# Patient Record
Sex: FEMALE | Race: WHITE | NOT HISPANIC OR LATINO | URBAN - METROPOLITAN AREA
[De-identification: names, ages, dates, MRNs, and addresses within clinical notes are randomized per-mention and may not be internally consistent; named-entity substitution may affect disease eponyms.]

---

## 2017-12-05 ENCOUNTER — EMERGENCY (EMERGENCY)
Facility: HOSPITAL | Age: 47
LOS: 0 days | Discharge: HOME | End: 2017-12-06

## 2017-12-05 DIAGNOSIS — M25.531 PAIN IN RIGHT WRIST: ICD-10-CM

## 2017-12-05 DIAGNOSIS — S69.91XA UNSPECIFIED INJURY OF RIGHT WRIST, HAND AND FINGER(S), INITIAL ENCOUNTER: ICD-10-CM

## 2017-12-05 DIAGNOSIS — Z90.11 ACQUIRED ABSENCE OF RIGHT BREAST AND NIPPLE: ICD-10-CM

## 2017-12-05 DIAGNOSIS — M79.89 OTHER SPECIFIED SOFT TISSUE DISORDERS: ICD-10-CM
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
## 2017-12-05 DIAGNOSIS — Z79.899 OTHER LONG TERM (CURRENT) DRUG THERAPY: ICD-10-CM
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
## 2021-08-06 ENCOUNTER — TRANSCRIPTION ENCOUNTER (OUTPATIENT)
Age: 51
End: 2021-08-06

## 2022-01-11 ENCOUNTER — NON-APPOINTMENT (OUTPATIENT)
Age: 52
End: 2022-01-11

## 2022-01-11 PROBLEM — Z00.00 ENCOUNTER FOR PREVENTIVE HEALTH EXAMINATION: Status: ACTIVE | Noted: 2022-01-11

## 2022-01-12 ENCOUNTER — NON-APPOINTMENT (OUTPATIENT)
Age: 52
End: 2022-01-12

## 2022-01-12 ENCOUNTER — APPOINTMENT (OUTPATIENT)
Dept: OBGYN | Facility: CLINIC | Age: 52
End: 2022-01-12
Payer: COMMERCIAL

## 2022-01-12 VITALS
HEART RATE: 100 BPM | HEIGHT: 66 IN | WEIGHT: 202 LBS | TEMPERATURE: 98.2 F | BODY MASS INDEX: 32.47 KG/M2 | SYSTOLIC BLOOD PRESSURE: 126 MMHG | DIASTOLIC BLOOD PRESSURE: 84 MMHG

## 2022-01-12 DIAGNOSIS — Z80.3 FAMILY HISTORY OF MALIGNANT NEOPLASM OF BREAST: ICD-10-CM

## 2022-01-12 DIAGNOSIS — Z86.39 PERSONAL HISTORY OF OTHER ENDOCRINE, NUTRITIONAL AND METABOLIC DISEASE: ICD-10-CM

## 2022-01-12 PROCEDURE — 99203 OFFICE O/P NEW LOW 30 MIN: CPT | Mod: 25

## 2022-01-12 PROCEDURE — 76830 TRANSVAGINAL US NON-OB: CPT

## 2022-01-12 NOTE — PROCEDURE
[Abnormal Uterine Bleeding] : abnormal uterine bleeding [Transvaginal Ultrasound] : transvaginal ultrasound [FreeTextEntry3] : thickened cystic endometrium\par no free fluid\par cervix normal [FreeTextEntry5] : 130cc volume,   16.4mm thick, cystic spaced [FreeTextEntry7] : 2.4cc [FreeTextEntry8] : 3.3cc

## 2022-01-12 NOTE — HISTORY OF PRESENT ILLNESS
[Irregular Menstrual Interval] : irregular menstrual interval [Abnormal Quantity] : abnormal quantity [Heavy Bleeding] : heavy bleeding [PGHxTotal] : 3 [[facility]] : 3 [FreeTextEntry1] : 3 's

## 2022-01-14 ENCOUNTER — NON-APPOINTMENT (OUTPATIENT)
Age: 52
End: 2022-01-14

## 2022-01-19 ENCOUNTER — RESULT REVIEW (OUTPATIENT)
Age: 52
End: 2022-01-19

## 2022-01-19 ENCOUNTER — OUTPATIENT (OUTPATIENT)
Dept: OUTPATIENT SERVICES | Facility: HOSPITAL | Age: 52
LOS: 1 days | Discharge: HOME | End: 2022-01-19
Payer: COMMERCIAL

## 2022-01-19 VITALS
SYSTOLIC BLOOD PRESSURE: 132 MMHG | HEART RATE: 72 BPM | RESPIRATION RATE: 16 BRPM | OXYGEN SATURATION: 100 % | WEIGHT: 200.62 LBS | HEIGHT: 66 IN | TEMPERATURE: 98 F | DIASTOLIC BLOOD PRESSURE: 65 MMHG

## 2022-01-19 DIAGNOSIS — Z01.818 ENCOUNTER FOR OTHER PREPROCEDURAL EXAMINATION: ICD-10-CM

## 2022-01-19 DIAGNOSIS — N95.0 POSTMENOPAUSAL BLEEDING: ICD-10-CM

## 2022-01-19 DIAGNOSIS — Z98.891 HISTORY OF UTERINE SCAR FROM PREVIOUS SURGERY: Chronic | ICD-10-CM

## 2022-01-19 DIAGNOSIS — Z90.11 ACQUIRED ABSENCE OF RIGHT BREAST AND NIPPLE: Chronic | ICD-10-CM

## 2022-01-19 LAB
A1C WITH ESTIMATED AVERAGE GLUCOSE RESULT: 7.5 % — HIGH (ref 4–5.6)
ALBUMIN SERPL ELPH-MCNC: 3.8 G/DL — SIGNIFICANT CHANGE UP (ref 3.5–5.2)
ALP SERPL-CCNC: 57 U/L — SIGNIFICANT CHANGE UP (ref 30–115)
ALT FLD-CCNC: 26 U/L — SIGNIFICANT CHANGE UP (ref 0–41)
ANION GAP SERPL CALC-SCNC: 12 MMOL/L — SIGNIFICANT CHANGE UP (ref 7–14)
APTT BLD: 31 SEC — SIGNIFICANT CHANGE UP (ref 27–39.2)
AST SERPL-CCNC: 21 U/L — SIGNIFICANT CHANGE UP (ref 0–41)
BASOPHILS # BLD AUTO: 0.04 K/UL — SIGNIFICANT CHANGE UP (ref 0–0.2)
BASOPHILS NFR BLD AUTO: 0.8 % — SIGNIFICANT CHANGE UP (ref 0–1)
BILIRUB SERPL-MCNC: 0.3 MG/DL — SIGNIFICANT CHANGE UP (ref 0.2–1.2)
BUN SERPL-MCNC: 17 MG/DL — SIGNIFICANT CHANGE UP (ref 10–20)
CALCIUM SERPL-MCNC: 9.5 MG/DL — SIGNIFICANT CHANGE UP (ref 8.5–10.1)
CHLORIDE SERPL-SCNC: 105 MMOL/L — SIGNIFICANT CHANGE UP (ref 98–110)
CO2 SERPL-SCNC: 24 MMOL/L — SIGNIFICANT CHANGE UP (ref 17–32)
CREAT SERPL-MCNC: 0.6 MG/DL — LOW (ref 0.7–1.5)
EOSINOPHIL # BLD AUTO: 0.06 K/UL — SIGNIFICANT CHANGE UP (ref 0–0.7)
EOSINOPHIL NFR BLD AUTO: 1.2 % — SIGNIFICANT CHANGE UP (ref 0–8)
ESTIMATED AVERAGE GLUCOSE: 169 MG/DL — HIGH (ref 68–114)
GLUCOSE SERPL-MCNC: 142 MG/DL — HIGH (ref 70–99)
HCT VFR BLD CALC: 38.1 % — SIGNIFICANT CHANGE UP (ref 37–47)
HGB BLD-MCNC: 11.7 G/DL — LOW (ref 12–16)
IMM GRANULOCYTES NFR BLD AUTO: 0.2 % — SIGNIFICANT CHANGE UP (ref 0.1–0.3)
INR BLD: 0.96 RATIO — SIGNIFICANT CHANGE UP (ref 0.65–1.3)
LYMPHOCYTES # BLD AUTO: 1.57 K/UL — SIGNIFICANT CHANGE UP (ref 1.2–3.4)
LYMPHOCYTES # BLD AUTO: 31.9 % — SIGNIFICANT CHANGE UP (ref 20.5–51.1)
MCHC RBC-ENTMCNC: 25.7 PG — LOW (ref 27–31)
MCHC RBC-ENTMCNC: 30.7 G/DL — LOW (ref 32–37)
MCV RBC AUTO: 83.6 FL — SIGNIFICANT CHANGE UP (ref 81–99)
MONOCYTES # BLD AUTO: 0.43 K/UL — SIGNIFICANT CHANGE UP (ref 0.1–0.6)
MONOCYTES NFR BLD AUTO: 8.7 % — SIGNIFICANT CHANGE UP (ref 1.7–9.3)
NEUTROPHILS # BLD AUTO: 2.81 K/UL — SIGNIFICANT CHANGE UP (ref 1.4–6.5)
NEUTROPHILS NFR BLD AUTO: 57.2 % — SIGNIFICANT CHANGE UP (ref 42.2–75.2)
NRBC # BLD: 0 /100 WBCS — SIGNIFICANT CHANGE UP (ref 0–0)
PLATELET # BLD AUTO: 280 K/UL — SIGNIFICANT CHANGE UP (ref 130–400)
POTASSIUM SERPL-MCNC: 5 MMOL/L — SIGNIFICANT CHANGE UP (ref 3.5–5)
POTASSIUM SERPL-SCNC: 5 MMOL/L — SIGNIFICANT CHANGE UP (ref 3.5–5)
PROT SERPL-MCNC: 6.7 G/DL — SIGNIFICANT CHANGE UP (ref 6–8)
PROTHROM AB SERPL-ACNC: 11 SEC — SIGNIFICANT CHANGE UP (ref 9.95–12.87)
RBC # BLD: 4.56 M/UL — SIGNIFICANT CHANGE UP (ref 4.2–5.4)
RBC # FLD: 16.8 % — HIGH (ref 11.5–14.5)
SODIUM SERPL-SCNC: 141 MMOL/L — SIGNIFICANT CHANGE UP (ref 135–146)
WBC # BLD: 4.92 K/UL — SIGNIFICANT CHANGE UP (ref 4.8–10.8)
WBC # FLD AUTO: 4.92 K/UL — SIGNIFICANT CHANGE UP (ref 4.8–10.8)

## 2022-01-19 PROCEDURE — 71046 X-RAY EXAM CHEST 2 VIEWS: CPT | Mod: 26

## 2022-01-19 PROCEDURE — 93010 ELECTROCARDIOGRAM REPORT: CPT

## 2022-01-19 NOTE — H&P PST ADULT - HISTORY OF PRESENT ILLNESS
PT PRESENTS TO PAST WITH NO SOB, CP, PALPITATIONS, DYSURIA, UTI OR URI AT PRESENT.   PT ABLE TO WALK UP 2-3 FLIGHTS OF STEPS WITH NO SOB.  AS PER THE PT, THIS IS HIS/HER COMPLETE MEDICAL AND SURGICAL HX, INCLUDING MEDICATIONS PRESCRIBED AND OVER THE COUNTER  pt denies any covid s/s, YES  12/21   tested positive in the past-- PT IS AWARE OF DATE AND TIME OF COVID TESTING PRIOR TO PROCEDURE.   pt advised self quarantine till day of procedure  denies travel outside the [place] in the past 30 days  Anesthesia Alert  NO--Difficult Airway  NO--History of neck surgery YES  radiation-2014   NO--Limited ROM of neck  NO--History of Malignant hyperthermia  NO--Personal or family history of Pseudocholinesterase deficiency  NO--Prior Anesthesia Complication  NO--Latex Allergy  NO--Loose teeth  NO--History of Rheumatoid Arthritis  NO--SRINATH  NO BLEEDING RISK  NO--Other_____

## 2022-01-19 NOTE — H&P PST ADULT - REASON FOR ADMISSION
Patient is a  51  year old female presenting to PAST in preparation for   dilation and curettage hysteroscopy symphon procedure      on   1/28/22    under gen   anesthesia by Dr. Madrigal .  Pt reports-I HAVE POST MENOPAUSAL BLEEDING FOR A FEW YEARS ON AND OFF. I HAVE NO PAIN ASSOCIATED WITH THE BLEEDING.

## 2022-01-19 NOTE — H&P PST ADULT - NSANTHOSAYNRD_GEN_A_CORE
No. SRINATH screening performed.  STOP BANG Legend: 0-2 = LOW Risk; 3-4 = INTERMEDIATE Risk; 5-8 = HIGH Risk

## 2022-01-21 ENCOUNTER — APPOINTMENT (OUTPATIENT)
Dept: GYNECOLOGIC ONCOLOGY | Facility: CLINIC | Age: 52
End: 2022-01-21

## 2022-01-25 ENCOUNTER — LABORATORY RESULT (OUTPATIENT)
Age: 52
End: 2022-01-25

## 2022-01-28 ENCOUNTER — RESULT REVIEW (OUTPATIENT)
Age: 52
End: 2022-01-28

## 2022-01-28 ENCOUNTER — APPOINTMENT (OUTPATIENT)
Dept: OBGYN | Facility: HOSPITAL | Age: 52
End: 2022-01-28

## 2022-01-28 ENCOUNTER — OUTPATIENT (OUTPATIENT)
Dept: OUTPATIENT SERVICES | Facility: HOSPITAL | Age: 52
LOS: 1 days | Discharge: HOME | End: 2022-01-28
Payer: COMMERCIAL

## 2022-01-28 VITALS
SYSTOLIC BLOOD PRESSURE: 135 MMHG | RESPIRATION RATE: 20 BRPM | OXYGEN SATURATION: 100 % | DIASTOLIC BLOOD PRESSURE: 61 MMHG | HEART RATE: 70 BPM

## 2022-01-28 VITALS
WEIGHT: 199.96 LBS | HEIGHT: 66 IN | SYSTOLIC BLOOD PRESSURE: 121 MMHG | TEMPERATURE: 97 F | RESPIRATION RATE: 17 BRPM | OXYGEN SATURATION: 100 % | DIASTOLIC BLOOD PRESSURE: 71 MMHG | HEART RATE: 77 BPM

## 2022-01-28 DIAGNOSIS — Z90.11 ACQUIRED ABSENCE OF RIGHT BREAST AND NIPPLE: Chronic | ICD-10-CM

## 2022-01-28 DIAGNOSIS — Z98.891 HISTORY OF UTERINE SCAR FROM PREVIOUS SURGERY: Chronic | ICD-10-CM

## 2022-01-28 LAB — GLUCOSE BLDC GLUCOMTR-MCNC: 149 MG/DL — HIGH (ref 70–99)

## 2022-01-28 PROCEDURE — 88305 TISSUE EXAM BY PATHOLOGIST: CPT | Mod: 26

## 2022-01-28 PROCEDURE — 58561 HYSTEROSCOPY REMOVE MYOMA: CPT

## 2022-01-28 NOTE — ASU DISCHARGE PLAN (ADULT/PEDIATRIC) - CARE PROVIDER_API CALL
Medication is being filled for 1 time refill only due to:  Patient needs labs lipids.    
Reason for Call:  Medication or medication refill:    Do you use a [facility]?  Name of the pharmacy and phone number for the current request:  [id number], [place] [address] [facility]    Name of the medication requested: Niacin CR 1000 MG TBCR    Other request: if any questions or concern pt gave permission to contact him on cell phone or his direct number at work. [phone]    Can we leave a detailed message on this number? YES    Phone number patient can be reached at: Cell number on file:    Telephone Information:   Mobile [phone]       Best Time: anytime.    Call taken on 1/31/2017 at 11:35 AM by Joellen López      
niacin cr 1000 MG TBCR       Last Written Prescription Date: 1/23/2017  Last Fill Quantity: 30, # refills: 0    Last Office Visit with [facility], [facility] or [facility] prescribing provider:  1/25/2017   Future Office Visit:      CHOLESTEROL   Date Value Ref Range Status   01/25/2016 145 <200 mg/dL Final     HDL CHOLESTEROL   Date Value Ref Range Status   01/25/2016 64 >39 mg/dL Final     LDL CHOLESTEROL CALCULATED   Date Value Ref Range Status   01/25/2016 63 <100 mg/dL Final     Comment:     Desirable:       <100 mg/dl     TRIGLYCERIDES   Date Value Ref Range Status   01/25/2016 91 <150 mg/dL Final     CHOLESTEROL/HDL RATIO   Date Value Ref Range Status   05/07/2014 2.4 0.0 - 5.0 Final     ALT   Date Value Ref Range Status   01/25/2016 32 0 - 70 U/L Final            
Elías Madrigal)  Obstetrics and Gynecology  [facility] [place] [address]  Phone: [phone]  Fax: [phone]  Follow Up Time: 2 weeks

## 2022-01-28 NOTE — ASU DISCHARGE PLAN (ADULT/PEDIATRIC) - NS MD DC FALL RISK RISK
For information on Fall & Injury Prevention, visit: [email or web address] OR  [email or web address] OR  https://www.cdc.gov/steadi/patient.html

## 2022-01-28 NOTE — ASU PATIENT PROFILE, ADULT - FALL HARM RISK - UNIVERSAL INTERVENTIONS
Bed in lowest position, wheels locked, appropriate side rails in place/Call bell, personal items and telephone in reach/Instruct patient to call for assistance before getting out of bed or chair/Non-slip footwear when patient is out of bed/[place] to call system/Physically safe environment - no spills, clutter or unnecessary equipment/Purposeful Proactive Rounding/Room/bathroom lighting operational, light cord in reach

## 2022-01-28 NOTE — PRE-ANESTHESIA EVALUATION ADULT - BP NONINVASIVE SYSTOLIC (MM HG)
Returned patient call after talking to DR Prince. Reviewed DR Prince reiterated that if patient positive COVID then she will be cancelled for ER and it is a guerra considering cost. Patient emotional about cancelling. Discussed patient going off OCPs for one month so she is not on OCPs for 3 months straight prior to stimulation. Patient will continue to monitor symptoms and will be in communication with clinic if she wants to continue this cycle or not.    121

## 2022-01-28 NOTE — CHART NOTE - NSCHARTNOTEFT_GEN_A_CORE
PACU ANESTHESIA ADMISSION NOTE      Procedure: Hysteroscopy, with dilation and curettage of uterus, with polypectomy or myomectomy      Post op diagnosis:  Uterine polyp        ____  Intubated  TV:______       Rate: ______      FiO2: ______    _x___  Patent Airway    _x___  Full return of protective reflexes    _x___  Full recovery from anesthesia / back to baseline status    Vitals:  T(F): 96.9   HR: 78  BP: 116/71  RR: 12  SpO2: 99%     Mental Status:  _x___ Awake   _x____ Alert   _____ Drowsy   _____ Sedated    Nausea/Vomiting:  _x___  NO       ______Yes,   See Post - Op Orders         Pain Scale (0-10):  __0___    Treatment: _x___ None    ____ See Post - Op/PCA Orders    Post - Operative Fluids:   __x__ Oral   ____ See Post - Op Orders    Plan: Discharge:   _x___Home       _____Floor     _____Critical Care    _____  Other:_________________    Comments:  No anesthesia issues or complications noted.  Discharge when criteria met.

## 2022-01-28 NOTE — BRIEF OPERATIVE NOTE - NSICDXBRIEFPROCEDURE_GEN_ALL_CORE_FT
PROCEDURES:  Hysteroscopy, with dilation and curettage of uterus, with polypectomy or myomectomy 28-Jan-2022 10:35:16  Karina Collier

## 2022-01-28 NOTE — BRIEF OPERATIVE NOTE - OPERATION/FINDINGS
Examined under anesthesia which revealed a 9 week sized anteverted uterus. Mobile. Normal vagina and cervix. Hysteroscopy revealed multiple polyps in endometrial cavity, resected with symphion device. Gentle sharp curettage revealed moderate amounts of endometrial tissue.

## 2022-02-02 ENCOUNTER — NON-APPOINTMENT (OUTPATIENT)
Age: 52
End: 2022-02-02

## 2022-02-02 ENCOUNTER — EMERGENCY (EMERGENCY)
Facility: HOSPITAL | Age: 52
LOS: 0 days | Discharge: HOME | End: 2022-02-02
Attending: EMERGENCY MEDICINE | Admitting: EMERGENCY MEDICINE
Payer: COMMERCIAL

## 2022-02-02 VITALS
OXYGEN SATURATION: 100 % | HEIGHT: 66 IN | DIASTOLIC BLOOD PRESSURE: 80 MMHG | TEMPERATURE: 97 F | RESPIRATION RATE: 18 BRPM | WEIGHT: 199.96 LBS | SYSTOLIC BLOOD PRESSURE: 142 MMHG | HEART RATE: 79 BPM

## 2022-02-02 VITALS
DIASTOLIC BLOOD PRESSURE: 66 MMHG | TEMPERATURE: 98 F | HEART RATE: 74 BPM | OXYGEN SATURATION: 99 % | SYSTOLIC BLOOD PRESSURE: 122 MMHG | RESPIRATION RATE: 16 BRPM

## 2022-02-02 DIAGNOSIS — E11.9 TYPE 2 DIABETES MELLITUS WITHOUT COMPLICATIONS: ICD-10-CM

## 2022-02-02 DIAGNOSIS — C50.919 MALIGNANT NEOPLASM OF UNSPECIFIED SITE OF UNSPECIFIED FEMALE BREAST: ICD-10-CM

## 2022-02-02 DIAGNOSIS — R51.9 HEADACHE, UNSPECIFIED: ICD-10-CM

## 2022-02-02 DIAGNOSIS — Z98.891 HISTORY OF UTERINE SCAR FROM PREVIOUS SURGERY: Chronic | ICD-10-CM

## 2022-02-02 DIAGNOSIS — Z90.11 ACQUIRED ABSENCE OF RIGHT BREAST AND NIPPLE: Chronic | ICD-10-CM

## 2022-02-02 DIAGNOSIS — R11.2 NAUSEA WITH VOMITING, UNSPECIFIED: ICD-10-CM

## 2022-02-02 DIAGNOSIS — Z79.84 LONG TERM (CURRENT) USE OF ORAL HYPOGLYCEMIC DRUGS: ICD-10-CM

## 2022-02-02 LAB
ALBUMIN SERPL ELPH-MCNC: 3 G/DL — LOW (ref 3.5–5.2)
ALP SERPL-CCNC: 37 U/L — SIGNIFICANT CHANGE UP (ref 30–115)
ALT FLD-CCNC: 14 U/L — SIGNIFICANT CHANGE UP (ref 0–41)
ANION GAP SERPL CALC-SCNC: 13 MMOL/L — SIGNIFICANT CHANGE UP (ref 7–14)
AST SERPL-CCNC: 15 U/L — SIGNIFICANT CHANGE UP (ref 0–41)
BASOPHILS # BLD AUTO: 0.02 K/UL — SIGNIFICANT CHANGE UP (ref 0–0.2)
BASOPHILS NFR BLD AUTO: 0.5 % — SIGNIFICANT CHANGE UP (ref 0–1)
BILIRUB SERPL-MCNC: <0.2 MG/DL — SIGNIFICANT CHANGE UP (ref 0.2–1.2)
BUN SERPL-MCNC: 10 MG/DL — SIGNIFICANT CHANGE UP (ref 10–20)
CALCIUM SERPL-MCNC: 8.4 MG/DL — LOW (ref 8.5–10.1)
CHLORIDE SERPL-SCNC: 107 MMOL/L — SIGNIFICANT CHANGE UP (ref 98–110)
CO2 SERPL-SCNC: 19 MMOL/L — SIGNIFICANT CHANGE UP (ref 17–32)
CREAT SERPL-MCNC: <0.5 MG/DL — LOW (ref 0.7–1.5)
EOSINOPHIL # BLD AUTO: 0.01 K/UL — SIGNIFICANT CHANGE UP (ref 0–0.7)
EOSINOPHIL NFR BLD AUTO: 0.3 % — SIGNIFICANT CHANGE UP (ref 0–8)
GLUCOSE SERPL-MCNC: 98 MG/DL — SIGNIFICANT CHANGE UP (ref 70–99)
HCG SERPL QL: NEGATIVE — SIGNIFICANT CHANGE UP
HCT VFR BLD CALC: 33.6 % — LOW (ref 37–47)
HGB BLD-MCNC: 10.6 G/DL — LOW (ref 12–16)
IMM GRANULOCYTES NFR BLD AUTO: 0.3 % — SIGNIFICANT CHANGE UP (ref 0.1–0.3)
LYMPHOCYTES # BLD AUTO: 1.5 K/UL — SIGNIFICANT CHANGE UP (ref 1.2–3.4)
LYMPHOCYTES # BLD AUTO: 41 % — SIGNIFICANT CHANGE UP (ref 20.5–51.1)
MCHC RBC-ENTMCNC: 26.4 PG — LOW (ref 27–31)
MCHC RBC-ENTMCNC: 31.5 G/DL — LOW (ref 32–37)
MCV RBC AUTO: 83.6 FL — SIGNIFICANT CHANGE UP (ref 81–99)
MONOCYTES # BLD AUTO: 0.25 K/UL — SIGNIFICANT CHANGE UP (ref 0.1–0.6)
MONOCYTES NFR BLD AUTO: 6.8 % — SIGNIFICANT CHANGE UP (ref 1.7–9.3)
NEUTROPHILS # BLD AUTO: 1.87 K/UL — SIGNIFICANT CHANGE UP (ref 1.4–6.5)
NEUTROPHILS NFR BLD AUTO: 51.1 % — SIGNIFICANT CHANGE UP (ref 42.2–75.2)
NRBC # BLD: 0 /100 WBCS — SIGNIFICANT CHANGE UP (ref 0–0)
PLATELET # BLD AUTO: 246 K/UL — SIGNIFICANT CHANGE UP (ref 130–400)
POTASSIUM SERPL-MCNC: 4 MMOL/L — SIGNIFICANT CHANGE UP (ref 3.5–5)
POTASSIUM SERPL-SCNC: 4 MMOL/L — SIGNIFICANT CHANGE UP (ref 3.5–5)
PROT SERPL-MCNC: 4.9 G/DL — LOW (ref 6–8)
RBC # BLD: 4.02 M/UL — LOW (ref 4.2–5.4)
RBC # FLD: 16.2 % — HIGH (ref 11.5–14.5)
SODIUM SERPL-SCNC: 139 MMOL/L — SIGNIFICANT CHANGE UP (ref 135–146)
WBC # BLD: 3.66 K/UL — LOW (ref 4.8–10.8)
WBC # FLD AUTO: 3.66 K/UL — LOW (ref 4.8–10.8)

## 2022-02-02 PROCEDURE — 99285 EMERGENCY DEPT VISIT HI MDM: CPT

## 2022-02-02 PROCEDURE — 70450 CT HEAD/BRAIN W/O DYE: CPT | Mod: 26,MA

## 2022-02-02 RX ORDER — METOCLOPRAMIDE HCL 10 MG
10 TABLET ORAL ONCE
Refills: 0 | Status: COMPLETED | OUTPATIENT
Start: 2022-02-02 | End: 2022-02-02

## 2022-02-02 RX ORDER — SODIUM CHLORIDE 9 MG/ML
1000 INJECTION, SOLUTION INTRAVENOUS ONCE
Refills: 0 | Status: COMPLETED | OUTPATIENT
Start: 2022-02-02 | End: 2022-02-02

## 2022-02-02 RX ORDER — METOCLOPRAMIDE HCL 10 MG
1 TABLET ORAL
Qty: 9 | Refills: 0
Start: 2022-02-02 | End: 2022-02-04

## 2022-02-02 RX ORDER — ACETAMINOPHEN 500 MG
650 TABLET ORAL ONCE
Refills: 0 | Status: COMPLETED | OUTPATIENT
Start: 2022-02-02 | End: 2022-02-02

## 2022-02-02 RX ADMIN — Medication 104 MILLIGRAM(S): at 17:30

## 2022-02-02 RX ADMIN — SODIUM CHLORIDE 1000 MILLILITER(S): 9 INJECTION, SOLUTION INTRAVENOUS at 17:30

## 2022-02-02 RX ADMIN — Medication 650 MILLIGRAM(S): at 17:30

## 2022-02-02 NOTE — ED PROVIDER NOTE - PROGRESS NOTE DETAILS
pt feeling better, results discussed at the bedside with daughter present, return precautions given and understood

## 2022-02-02 NOTE — ED PROVIDER NOTE - NSFOLLOWUPCLINICS_GEN_ALL_ED_FT
[facility]  Neurology  [address] [place] [address], [facility] [address]  [place] [address]  Phone: [phone]  Fax:   Follow Up Time: 1-3 Days

## 2022-02-02 NOTE — ED PROVIDER NOTE - COVID-19 ORDERING FACILITY
[facility]  - [facility] Obstetrics Gynecology at [facility]

## 2022-02-02 NOTE — ED PROVIDER NOTE - PATIENT PORTAL LINK FT
You can access the FollowMyHealth Patient Portal offered by [facility] by registering at the following website: [email or web address]. By joining [employer]’s FollowMyHealth portal, you will also be able to view your health information using other applications (apps) compatible with our system.

## 2022-02-02 NOTE — ED ADULT NURSE NOTE - CAS TRG GEN SKIN CONDITION
Dapsone Pregnancy And Lactation Text: This medication is Pregnancy Category C and is not considered safe during pregnancy or breast feeding. Bactrim Pregnancy And Lactation Text: This medication is Pregnancy Category D and is known to cause fetal risk.  It is also excreted in breast milk. Minocycline Pregnancy And Lactation Text: This medication is Pregnancy Category D and not consider safe during pregnancy. It is also excreted in breast milk. Sarecycline Counseling: Patient advised regarding possible photosensitivity and discoloration of the teeth, skin, lips, tongue and gums.  Patient instructed to avoid sunlight, if possible.  When exposed to sunlight, patients should wear protective clothing, sunglasses, and sunscreen.  The patient was instructed to call the office immediately if the following severe adverse effects occur:  hearing changes, easy bruising/bleeding, severe headache, or vision changes.  The patient verbalized understanding of the proper use and possible adverse effects of sarecycline.  All of the patient's questions and concerns were addressed. Bactrim Counseling:  I discussed with the patient the risks of sulfa antibiotics including but not limited to GI upset, allergic reaction, drug rash, diarrhea, dizziness, photosensitivity, and yeast infections.  Rarely, more serious reactions can occur including but not limited to aplastic anemia, agranulocytosis, methemoglobinemia, blood dyscrasias, liver or kidney failure, lung infiltrates or desquamative/blistering drug rashes. Topical Clindamycin Pregnancy And Lactation Text: This medication is Pregnancy Category B and is considered safe during pregnancy. It is unknown if it is excreted in breast milk. Topical Retinoid counseling:  Patient advised to apply a pea-sized amount only at bedtime and wait 30 minutes after washing their face before applying.  If too drying, patient may add a non-comedogenic moisturizer. The patient verbalized understanding of the proper use and possible adverse effects of retinoids.  All of the patient's questions and concerns were addressed. Erythromycin Counseling:  I discussed with the patient the risks of erythromycin including but not limited to GI upset, allergic reaction, drug rash, diarrhea, increase in liver enzymes, and yeast infections. Detail Level: Zone Topical Retinoid Pregnancy And Lactation Text: This medication is Pregnancy Category C. It is unknown if this medication is excreted in breast milk. Topical Sulfur Applications Counseling: Topical Sulfur Counseling: Patient counseled that this medication may cause skin irritation or allergic reactions.  In the event of skin irritation, the patient was advised to reduce the amount of the drug applied or use it less frequently.   The patient verbalized understanding of the proper use and possible adverse effects of topical sulfur application.  All of the patient's questions and concerns were addressed. Dapsone Counseling: I discussed with the patient the risks of dapsone including but not limited to hemolytic anemia, agranulocytosis, rashes, methemoglobinemia, kidney failure, peripheral neuropathy, headaches, GI upset, and liver toxicity.  Patients who start dapsone require monitoring including baseline LFTs and weekly CBCs for the first month, then every month thereafter.  The patient verbalized understanding of the proper use and possible adverse effects of dapsone.  All of the patient's questions and concerns were addressed. Azithromycin Counseling:  I discussed with the patient the risks of azithromycin including but not limited to GI upset, allergic reaction, drug rash, diarrhea, and yeast infections. Detail Level: Simple Topical Clindamycin Counseling: Patient counseled that this medication may cause skin irritation or allergic reactions.  In the event of skin irritation, the patient was advised to reduce the amount of the drug applied or use it less frequently.   The patient verbalized understanding of the proper use and possible adverse effects of clindamycin.  All of the patient's questions and concerns were addressed. Tazorac Pregnancy And Lactation Text: This medication is not safe during pregnancy. It is unknown if this medication is excreted in breast milk. Benzoyl Peroxide Pregnancy And Lactation Text: This medication is Pregnancy Category C. It is unknown if benzoyl peroxide is excreted in breast milk. Include Pregnancy/Lactation Warning?: No Isotretinoin Counseling: Patient should get monthly blood tests, not donate blood, not drive at night if vision affected, not share medication, and not undergo elective surgery for 6 months after tx completed. Side effects reviewed, pt to contact office should one occur. Birth Control Pills Pregnancy And Lactation Text: This medication should be avoided if pregnant and for the first 30 days post-partum. Doxycycline Pregnancy And Lactation Text: This medication is Pregnancy Category D and not consider safe during pregnancy. It is also excreted in breast milk but is considered safe for shorter treatment courses. Spironolactone Counseling: Patient advised regarding risks of diarrhea, abdominal pain, hyperkalemia, birth defects (for female patients), liver toxicity and renal toxicity. The patient may need blood work to monitor liver and kidney function and potassium levels while on therapy. The patient verbalized understanding of the proper use and possible adverse effects of spironolactone.  All of the patient's questions and concerns were addressed. Benzoyl Peroxide Counseling: Patient counseled that medicine may cause skin irritation and bleach clothing.  In the event of skin irritation, the patient was advised to reduce the amount of the drug applied or use it less frequently.   The patient verbalized understanding of the proper use and possible adverse effects of benzoyl peroxide.  All of the patient's questions and concerns were addressed. Isotretinoin Pregnancy And Lactation Text: This medication is Pregnancy Category X and is considered extremely dangerous during pregnancy. It is unknown if it is excreted in breast milk. High Dose Vitamin A Counseling: Side effects reviewed, pt to contact office should one occur. Doxycycline Counseling:  Patient counseled regarding possible photosensitivity and increased risk for sunburn.  Patient instructed to avoid sunlight, if possible.  When exposed to sunlight, patients should wear protective clothing, sunglasses, and sunscreen.  The patient was instructed to call the office immediately if the following severe adverse effects occur:  hearing changes, easy bruising/bleeding, severe headache, or vision changes.  The patient verbalized understanding of the proper use and possible adverse effects of doxycycline.  All of the patient's questions and concerns were addressed. Birth Control Pills Counseling: Birth Control Pill Counseling: I discussed with the patient the potential side effects of OCPs including but not limited to increased risk of stroke, heart attack, thrombophlebitis, deep venous thrombosis, hepatic adenomas, breast changes, GI upset, headaches, and depression.  The patient verbalized understanding of the proper use and possible adverse effects of OCPs. All of the patient's questions and concerns were addressed. Tetracycline Counseling: Patient counseled regarding possible photosensitivity and increased risk for sunburn.  Patient instructed to avoid sunlight, if possible.  When exposed to sunlight, patients should wear protective clothing, sunglasses, and sunscreen.  The patient was instructed to call the office immediately if the following severe adverse effects occur:  hearing changes, easy bruising/bleeding, severe headache, or vision changes.  The patient verbalized understanding of the proper use and possible adverse effects of tetracycline.  All of the patient's questions and concerns were addressed. Patient understands to avoid pregnancy while on therapy due to potential birth defects. Spironolactone Pregnancy And Lactation Text: This medication can cause feminization of the male fetus and should be avoided during pregnancy. The active metabolite is also found in breast milk. Erythromycin Pregnancy And Lactation Text: This medication is Pregnancy Category B and is considered safe during pregnancy. It is also excreted in breast milk. Azithromycin Pregnancy And Lactation Text: This medication is considered safe during pregnancy and is also secreted in breast milk. High Dose Vitamin A Pregnancy And Lactation Text: High dose vitamin A therapy is contraindicated during pregnancy and breast feeding. Minocycline Counseling: Patient advised regarding possible photosensitivity and discoloration of the teeth, skin, lips, tongue and gums.  Patient instructed to avoid sunlight, if possible.  When exposed to sunlight, patients should wear protective clothing, sunglasses, and sunscreen.  The patient was instructed to call the office immediately if the following severe adverse effects occur:  hearing changes, easy bruising/bleeding, severe headache, or vision changes.  The patient verbalized understanding of the proper use and possible adverse effects of minocycline.  All of the patient's questions and concerns were addressed. Topical Sulfur Applications Pregnancy And Lactation Text: This medication is Pregnancy Category C and has an unknown safety profile during pregnancy. It is unknown if this topical medication is excreted in breast milk. Tazorac Counseling:  Patient advised that medication is irritating and drying.  Patient may need to apply sparingly and wash off after an hour before eventually leaving it on overnight.  The patient verbalized understanding of the proper use and possible adverse effects of tazorac.  All of the patient's questions and concerns were addressed. Warm/Dry

## 2022-02-02 NOTE — ED ADULT TRIAGE NOTE - CHIEF COMPLAINT QUOTE
"since 7am this morning shantel had a headache on both sides of my head with dizziness and I vomited once "

## 2022-02-02 NOTE — ED ADULT NURSE NOTE - OBJECTIVE STATEMENT
presents to ED with headache, nausea & vomiting since this morning. Denies dizziness, weakness, vision changes.

## 2022-02-02 NOTE — ED PROVIDER NOTE - OBJECTIVE STATEMENT
51 y.o. female with a PMH of breast ca. and DM presented to the ER c/o frontal headache this morning associated with nausea and vomiting.  Pt states that her BP was elevated at work.  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  PT compliant with meds.  Denies fever, chills, chest pain, abdominal pain, vision changes, ataxia, dysarthria, extremity weakness/paresthesias.  No other complaints.

## 2022-02-02 NOTE — ED PROVIDER NOTE - NSFOLLOWUPINSTRUCTIONS_ED_ALL_ED_FT
[employer]® CareNotes®     :  [facility]             GENERAL HEADACHE - Ambulatory Care     General Headache    AMBULATORY CARE:    Headache pain may be mild or severe. Common causes include stress, medicines, and head injuries. Sleep problems, allergies, and hormone changes can also cause a headache. You may have frequent headaches that have no clear cause. Pain may start in another part of your body and move to your head. Headache pain can also move to other parts of your body. A headache can cause other symptoms, such as nausea and vomiting. A severe headache may be a sign of a stroke or other serious problem that needs immediate treatment.    Call 911 for any of the following:     You have any of the following signs of a stroke:   Numbness or drooping on one side of your face       Weakness in an arm or leg      Confusion or difficulty speaking      Dizziness, a severe headache, or vision loss        Seek care immediately if:     You have a headache with neck stiffness and a fever.      You have a constant headache and are vomiting.      You have severe pain that does not get better after you take pain medicine.      You have a headache and the pain worsens when you look into light.      You have a headache and vision changes, such as blurred vision.      You have a headache and are forgetful or confused.    Contact your healthcare provider if:     You have a headache each day that does not get better, even after treatment.      You have changes in your headaches, or new symptoms that occur when you have a headache.      Others you live or work with also have headaches.      You have questions or concerns about your condition or care.    Treatment may include any of the following:     Medicines may be given to prevent or treat headache pain. Do not wait until the pain is severe to take your medicine. Ask your healthcare provider how to take the medicine safely.       NSAIDs, such as ibuprofen, help decrease swelling, pain, and fever. This medicine is available with or without a doctor's order. NSAIDs can cause stomach bleeding or kidney problems in certain people. If you take blood thinner medicine, always ask if NSAIDs are safe for you. Always read the medicine label and follow directions. Do not give these medicines to children under 6 months of age without direction from your child's healthcare provider.      Acetaminophen decreases pain and fever. It is available without a doctor's order. Ask how much to take and how often to take it. Follow directions. Read the labels of all other medicines you are using to see if they also contain acetaminophen, or ask your doctor or pharmacist. Acetaminophen can cause liver damage if not taken correctly. Do not use more than 4 grams (4,000 milligrams) total of acetaminophen in one day.       Antinausea medicine may be given to calm your stomach and help prevent vomiting.    Manage your symptoms:     Rest in a dark and quiet room. This may help decrease your pain.      Apply heat or ice as directed. Heat or ice may help decrease pain or muscle spasms. Apply heat or ice on the area for 20 minutes every 2 hours for as many days as directed. Your healthcare provider may recommend that you alternate heat and ice.      Relax your muscles to help relieve a headache. Lie down in a comfortable position and close your eyes. Relax your muscles slowly. Start at your toes and work your way up your body. A massage or warm bath may also help relax your muscles.    Keep a headache record: Record the dates and times that you get headaches, and what you were doing before the headache started. Also record what you ate and drank in the 24 hours before the headache started. This might help your healthcare provider find the cause of your headaches and make a treatment plan. The record can also help you avoid headache triggers or manage your symptoms.    Get enough sleep: You should get 8 to 10 hours of sleep each night. Create a sleep schedule. Go to bed and wake up at the same times each day. It may be helpful to do something relaxing before bed. Do not watch television right before bed.    Do not smoke: Nicotine and other chemicals in cigarettes and cigars can trigger a headache or make it worse. Ask your healthcare provider for information if you currently smoke and need help to quit. E-cigarettes or smokeless tobacco still contain nicotine. Talk to your healthcare provider before you use these products.     Drink liquids as directed: You may need to drink more liquid to prevent dehydration. Dehydration can cause a headache. Ask your healthcare provider how much liquid to drink each day and which liquids are best for you.     Limit caffeine and alcohol as directed: Your headaches may be triggered by caffeine or alcohol. You may also develop a headache if you drink caffeine regularly and suddenly stop.    Eat a variety of healthy foods: Do not skip meals. Too little food can trigger a headache. Include fruits, vegetables, whole-grain breads, low-fat dairy products, beans, lean meat, and fish. Do not have trigger foods, such as chocolate and red wine. Foods that contain gluten, nitrates, MSG, or artificial sweeteners may also trigger a headache.    Follow up with your healthcare provider as directed: Write down your questions so you remember to ask them during your visits.        © Copyright [employer] 2020       back to top                      © Copyright [employer] 2020

## 2022-02-02 NOTE — ED PROVIDER NOTE - NSCAREINITIATED _GEN_ER
"Chief Complaint   Patient presents with     Prenatal Care     OBV 12w3d       Initial /78 (BP Location: Left arm, Patient Position: Chair, Cuff Size: Adult Regular)  Pulse 67  Temp 96.7  F (35.9  C) (Oral)  Wt 137 lb (62.1 kg)  LMP 02/13/2017 (Exact Date)  Breastfeeding? No  BMI 22.8 kg/m2 Estimated body mass index is 22.8 kg/(m^2) as calculated from the following:    Height as of 3/21/17: 5' 5\" (1.651 m).    Weight as of this encounter: 137 lb (62.1 kg).  Medication Reconciliation: complete   Christai ACE Beasley      " Lourdes Garza)

## 2022-02-02 NOTE — ED PROVIDER NOTE - ATTENDING CONTRIBUTION TO CARE
50 yo f with pmh of dm and breast ca under remission, presents with c/o headache with n/v.  pt says started this morning. +frontal and top of head.  no dizziness.  no focal numbness, weakness, facial droop, slurred speech, or vision changes.  no fever, no chills.  no neck pain.  no cp, no sob, no abd pain.  had recent d&c for DUB last week.  exam: nad, ncat, perrl, eomi, mmm, rrr, ctab, abd soft, nt, nd, aox3, cn2-12 normal, 5/5 strength all ext, sensation intact, finger to nose normal, gait normal imp: pt with headache with n/v.  reassuring neuro exam, will check labs and CT head

## 2022-02-04 LAB — SURGICAL PATHOLOGY STUDY: SIGNIFICANT CHANGE UP

## 2022-02-08 DIAGNOSIS — N85.4 MALPOSITION OF UTERUS: ICD-10-CM

## 2022-02-08 DIAGNOSIS — Z92.3 PERSONAL HISTORY OF IRRADIATION: ICD-10-CM

## 2022-02-08 DIAGNOSIS — Z79.84 LONG TERM (CURRENT) USE OF ORAL HYPOGLYCEMIC DRUGS: ICD-10-CM

## 2022-02-08 DIAGNOSIS — Z90.11 ACQUIRED ABSENCE OF RIGHT BREAST AND NIPPLE: ICD-10-CM

## 2022-02-08 DIAGNOSIS — N84.0 POLYP OF CORPUS UTERI: ICD-10-CM

## 2022-02-08 DIAGNOSIS — Z92.21 PERSONAL HISTORY OF ANTINEOPLASTIC CHEMOTHERAPY: ICD-10-CM

## 2022-02-08 DIAGNOSIS — N95.0 POSTMENOPAUSAL BLEEDING: ICD-10-CM

## 2022-02-08 DIAGNOSIS — E11.9 TYPE 2 DIABETES MELLITUS WITHOUT COMPLICATIONS: ICD-10-CM

## 2022-02-08 DIAGNOSIS — Z85.3 PERSONAL HISTORY OF MALIGNANT NEOPLASM OF BREAST: ICD-10-CM

## 2022-02-09 ENCOUNTER — APPOINTMENT (OUTPATIENT)
Dept: OBGYN | Facility: CLINIC | Age: 52
End: 2022-02-09
Payer: COMMERCIAL

## 2022-02-09 VITALS
SYSTOLIC BLOOD PRESSURE: 138 MMHG | HEART RATE: 98 BPM | WEIGHT: 200 LBS | HEIGHT: 66 IN | BODY MASS INDEX: 32.14 KG/M2 | DIASTOLIC BLOOD PRESSURE: 84 MMHG

## 2022-02-09 PROCEDURE — 99213 OFFICE O/P EST LOW 20 MIN: CPT

## 2022-02-09 NOTE — HISTORY OF PRESENT ILLNESS
[FreeTextEntry1] : on 7th year of tamoxifen...had bleeding\par D and C: hyperplasia without atypia.\par Long discussion with slime and her doctor, Dr. Elías Crowe\par

## 2022-02-10 ENCOUNTER — NON-APPOINTMENT (OUTPATIENT)
Age: 52
End: 2022-02-10

## 2022-02-22 ENCOUNTER — LABORATORY RESULT (OUTPATIENT)
Age: 52
End: 2022-02-22

## 2022-02-28 ENCOUNTER — NON-APPOINTMENT (OUTPATIENT)
Age: 52
End: 2022-02-28

## 2022-08-10 ENCOUNTER — APPOINTMENT (OUTPATIENT)
Dept: OBGYN | Facility: CLINIC | Age: 52
End: 2022-08-10

## 2022-08-10 VITALS
BODY MASS INDEX: 32.47 KG/M2 | HEART RATE: 81 BPM | TEMPERATURE: 98.1 F | DIASTOLIC BLOOD PRESSURE: 83 MMHG | SYSTOLIC BLOOD PRESSURE: 127 MMHG | HEIGHT: 66 IN | WEIGHT: 202 LBS

## 2022-08-10 DIAGNOSIS — N76.2 ACUTE VULVITIS: ICD-10-CM

## 2022-08-10 PROCEDURE — 99396 PREV VISIT EST AGE 40-64: CPT | Mod: 25

## 2022-08-10 PROCEDURE — 76830 TRANSVAGINAL US NON-OB: CPT

## 2022-08-10 RX ORDER — CLOTRIMAZOLE AND BETAMETHASONE DIPROPIONATE 10; .5 MG/G; MG/G
1-0.05 CREAM TOPICAL TWICE DAILY
Qty: 1 | Refills: 3 | Status: ACTIVE | COMMUNITY
Start: 2022-08-10 | End: 1900-01-01

## 2022-08-10 NOTE — PROCEDURE
[Transvaginal Ultrasound] : transvaginal ultrasound [FreeTextEntry3] : uterus 112cc volume\par endometrium 5mm\par cervix normal \par free fluid on the left side\par no cysts [FreeTextEntry5] : 112cc vol,   endometrium 5mm [FreeTextEntry7] : 3.0cc [FreeTextEntry8] : 3.7cc

## 2022-08-10 NOTE — HISTORY OF PRESENT ILLNESS
[FreeTextEntry1] : had d and c hysteroscopy after heavy bleeding on tamoxifen\par results were: "focus of simple hyperplasia without atypia"\par She stayed on the tamoxifen and is her for fu, pap and sono

## 2022-08-14 LAB
C TRACH RRNA SPEC QL NAA+PROBE: NOT DETECTED
HPV HIGH+LOW RISK DNA PNL CVX: NOT DETECTED
N GONORRHOEA RRNA SPEC QL NAA+PROBE: NOT DETECTED
SOURCE AMPLIFICATION: NORMAL

## 2022-08-20 LAB — CYTOLOGY CVX/VAG DOC THIN PREP: ABNORMAL

## 2023-07-05 ENCOUNTER — APPOINTMENT (OUTPATIENT)
Dept: OBGYN | Facility: CLINIC | Age: 53
End: 2023-07-05
Payer: COMMERCIAL

## 2023-07-05 VITALS
WEIGHT: 200 LBS | HEART RATE: 83 BPM | DIASTOLIC BLOOD PRESSURE: 83 MMHG | HEIGHT: 66 IN | BODY MASS INDEX: 32.14 KG/M2 | SYSTOLIC BLOOD PRESSURE: 126 MMHG

## 2023-07-05 DIAGNOSIS — Z85.3 PERSONAL HISTORY OF MALIGNANT NEOPLASM OF BREAST: ICD-10-CM

## 2023-07-05 DIAGNOSIS — N83.209 UNSPECIFIED OVARIAN CYST, UNSPECIFIED SIDE: ICD-10-CM

## 2023-07-05 PROCEDURE — 99213 OFFICE O/P EST LOW 20 MIN: CPT | Mod: 25

## 2023-07-05 PROCEDURE — 76830 TRANSVAGINAL US NON-OB: CPT

## 2023-07-05 NOTE — PROCEDURE
[Transvaginal Ultrasound] : transvaginal ultrasound [FreeTextEntry9] : suspected polyp [FreeTextEntry3] : thick linin mm\par copious fluid in cul de sac...ruptured cyst?\par cervix normal [FreeTextEntry5] : 83 cc volume,  19 mm max joseph endometrium...polyp? [FreeTextEntry7] : 1.7 cc [FreeTextEntry8] : 1.6 cc [FreeTextEntry4] : endometrial polyp\par ruptured cyst of ovary

## 2023-07-07 LAB
CYTOLOGY CVX/VAG DOC THIN PREP: NORMAL
HPV HIGH+LOW RISK DNA PNL CVX: NOT DETECTED

## 2023-07-10 ENCOUNTER — NON-APPOINTMENT (OUTPATIENT)
Age: 53
End: 2023-07-10

## 2023-07-21 ENCOUNTER — NON-APPOINTMENT (OUTPATIENT)
Age: 53
End: 2023-07-21

## 2023-08-04 ENCOUNTER — OUTPATIENT (OUTPATIENT)
Dept: OUTPATIENT SERVICES | Facility: HOSPITAL | Age: 53
LOS: 1 days | End: 2023-08-04
Payer: COMMERCIAL

## 2023-08-04 ENCOUNTER — RESULT REVIEW (OUTPATIENT)
Age: 53
End: 2023-08-04

## 2023-08-04 VITALS
RESPIRATION RATE: 16 BRPM | TEMPERATURE: 98 F | WEIGHT: 197.09 LBS | DIASTOLIC BLOOD PRESSURE: 76 MMHG | HEIGHT: 66 IN | OXYGEN SATURATION: 100 % | SYSTOLIC BLOOD PRESSURE: 125 MMHG | HEART RATE: 76 BPM

## 2023-08-04 DIAGNOSIS — Z01.818 ENCOUNTER FOR OTHER PREPROCEDURAL EXAMINATION: ICD-10-CM

## 2023-08-04 DIAGNOSIS — Z98.891 HISTORY OF UTERINE SCAR FROM PREVIOUS SURGERY: Chronic | ICD-10-CM

## 2023-08-04 DIAGNOSIS — Z90.11 ACQUIRED ABSENCE OF RIGHT BREAST AND NIPPLE: Chronic | ICD-10-CM

## 2023-08-04 DIAGNOSIS — N85.9 NONINFLAMMATORY DISORDER OF UTERUS, UNSPECIFIED: ICD-10-CM

## 2023-08-04 LAB
A1C WITH ESTIMATED AVERAGE GLUCOSE RESULT: 7 % — HIGH (ref 4–5.6)
ALBUMIN SERPL ELPH-MCNC: 4.2 G/DL — SIGNIFICANT CHANGE UP (ref 3.5–5.2)
ALP SERPL-CCNC: 49 U/L — SIGNIFICANT CHANGE UP (ref 30–115)
ALT FLD-CCNC: 17 U/L — SIGNIFICANT CHANGE UP (ref 0–41)
ANION GAP SERPL CALC-SCNC: 9 MMOL/L — SIGNIFICANT CHANGE UP (ref 7–14)
APTT BLD: 32.7 SEC — SIGNIFICANT CHANGE UP (ref 27–39.2)
AST SERPL-CCNC: 16 U/L — SIGNIFICANT CHANGE UP (ref 0–41)
BASOPHILS # BLD AUTO: 0.02 K/UL — SIGNIFICANT CHANGE UP (ref 0–0.2)
BASOPHILS NFR BLD AUTO: 0.6 % — SIGNIFICANT CHANGE UP (ref 0–1)
BILIRUB SERPL-MCNC: 0.2 MG/DL — SIGNIFICANT CHANGE UP (ref 0.2–1.2)
BUN SERPL-MCNC: 9 MG/DL — LOW (ref 10–20)
CALCIUM SERPL-MCNC: 9.5 MG/DL — SIGNIFICANT CHANGE UP (ref 8.4–10.5)
CHLORIDE SERPL-SCNC: 104 MMOL/L — SIGNIFICANT CHANGE UP (ref 98–110)
CO2 SERPL-SCNC: 26 MMOL/L — SIGNIFICANT CHANGE UP (ref 17–32)
CREAT SERPL-MCNC: 0.7 MG/DL — SIGNIFICANT CHANGE UP (ref 0.7–1.5)
EGFR: 103 ML/MIN/1.73M2 — SIGNIFICANT CHANGE UP
EOSINOPHIL # BLD AUTO: 0.02 K/UL — SIGNIFICANT CHANGE UP (ref 0–0.7)
EOSINOPHIL NFR BLD AUTO: 0.6 % — SIGNIFICANT CHANGE UP (ref 0–8)
ESTIMATED AVERAGE GLUCOSE: 154 MG/DL — HIGH (ref 68–114)
GLUCOSE SERPL-MCNC: 137 MG/DL — HIGH (ref 70–99)
HCT VFR BLD CALC: 40.5 % — SIGNIFICANT CHANGE UP (ref 37–47)
HGB BLD-MCNC: 12.6 G/DL — SIGNIFICANT CHANGE UP (ref 12–16)
IMM GRANULOCYTES NFR BLD AUTO: 0.3 % — SIGNIFICANT CHANGE UP (ref 0.1–0.3)
INR BLD: 0.94 RATIO — SIGNIFICANT CHANGE UP (ref 0.65–1.3)
LYMPHOCYTES # BLD AUTO: 1.43 K/UL — SIGNIFICANT CHANGE UP (ref 1.2–3.4)
LYMPHOCYTES # BLD AUTO: 44.3 % — SIGNIFICANT CHANGE UP (ref 20.5–51.1)
MCHC RBC-ENTMCNC: 26 PG — LOW (ref 27–31)
MCHC RBC-ENTMCNC: 31.1 G/DL — LOW (ref 32–37)
MCV RBC AUTO: 83.7 FL — SIGNIFICANT CHANGE UP (ref 81–99)
MONOCYTES # BLD AUTO: 0.28 K/UL — SIGNIFICANT CHANGE UP (ref 0.1–0.6)
MONOCYTES NFR BLD AUTO: 8.7 % — SIGNIFICANT CHANGE UP (ref 1.7–9.3)
NEUTROPHILS # BLD AUTO: 1.47 K/UL — SIGNIFICANT CHANGE UP (ref 1.4–6.5)
NEUTROPHILS NFR BLD AUTO: 45.5 % — SIGNIFICANT CHANGE UP (ref 42.2–75.2)
NRBC # BLD: 0 /100 WBCS — SIGNIFICANT CHANGE UP (ref 0–0)
PLATELET # BLD AUTO: 281 K/UL — SIGNIFICANT CHANGE UP (ref 130–400)
PMV BLD: 11.5 FL — HIGH (ref 7.4–10.4)
POTASSIUM SERPL-MCNC: 4.8 MMOL/L — SIGNIFICANT CHANGE UP (ref 3.5–5)
POTASSIUM SERPL-SCNC: 4.8 MMOL/L — SIGNIFICANT CHANGE UP (ref 3.5–5)
PROT SERPL-MCNC: 6.9 G/DL — SIGNIFICANT CHANGE UP (ref 6–8)
PROTHROM AB SERPL-ACNC: 10.7 SEC — SIGNIFICANT CHANGE UP (ref 9.95–12.87)
RBC # BLD: 4.84 M/UL — SIGNIFICANT CHANGE UP (ref 4.2–5.4)
RBC # FLD: 15.1 % — HIGH (ref 11.5–14.5)
SODIUM SERPL-SCNC: 139 MMOL/L — SIGNIFICANT CHANGE UP (ref 135–146)
WBC # BLD: 3.23 K/UL — LOW (ref 4.8–10.8)
WBC # FLD AUTO: 3.23 K/UL — LOW (ref 4.8–10.8)

## 2023-08-04 PROCEDURE — 85730 THROMBOPLASTIN TIME PARTIAL: CPT

## 2023-08-04 PROCEDURE — 71046 X-RAY EXAM CHEST 2 VIEWS: CPT

## 2023-08-04 PROCEDURE — 85025 COMPLETE CBC W/AUTO DIFF WBC: CPT

## 2023-08-04 PROCEDURE — 93010 ELECTROCARDIOGRAM REPORT: CPT

## 2023-08-04 PROCEDURE — 71046 X-RAY EXAM CHEST 2 VIEWS: CPT | Mod: 26

## 2023-08-04 PROCEDURE — 85610 PROTHROMBIN TIME: CPT

## 2023-08-04 PROCEDURE — 80053 COMPREHEN METABOLIC PANEL: CPT

## 2023-08-04 PROCEDURE — 93005 ELECTROCARDIOGRAM TRACING: CPT

## 2023-08-04 PROCEDURE — 36415 COLL VENOUS BLD VENIPUNCTURE: CPT

## 2023-08-04 PROCEDURE — 99214 OFFICE O/P EST MOD 30 MIN: CPT | Mod: 25

## 2023-08-04 PROCEDURE — 83036 HEMOGLOBIN GLYCOSYLATED A1C: CPT

## 2023-08-04 NOTE — H&P PST ADULT - REASON FOR ADMISSION
Case Type: OP Block TimeSuite: [place]ced[place]ist: Elías Madrigal  Confirmed Surgery DateTime: 08- - 0:00PAST DateTime: 08- - 6:15Procedure: PELVIC EXAMINATION UNDER ANESTHESIA, DILATION AND CURETTAGE HYSTEROSCOPY SYMPHION PROCEDURE  ERP?: NoLaterality: N/ALength of Procedure: 60 Minutes  Anesthesia Type: General

## 2023-08-04 NOTE — H&P PST ADULT - HISTORY OF PRESENT ILLNESS
52y Female presents today for presurgical testing for PELVIC EXAMINATION UNDER ANESTHESIA, DILATION AND CURETTAGE HYSTEROSCOPY SYMPHION PROCEDURE. Patient reports incidental finding of uterine polyp during last gyn exam. Per last gn note 23 "hx of d and c hysteroscopy polypectomy, 18 mths ago, here for fu.....  Ultrasound:  Chief Complaint: suspected polyp.  Procedure Performed: transvaginal ultrasound.  Comments: thick linin mm  ?  copious fluid in cul de sac...ruptured cyst?  ?  cervix normal.  Uterus Size:  83 cc volume, 19 mm max joseph endometrium...polyp?.  Right Ovary Measurement: 1.7 cc  Left Ovary Measurement: 1.6 cc  Impression: endometrial polyp"  Patient reports feeling "good" overall. Now for scheduled procedure. Of note, patient mentions recent injury to left hand middle finger which she demonstrates swollen, small area of ecchymosis to ventral aspect of the middle finger. She denied any pain on palpation and was able to flex the finger nearly fully. She was instructed to go to the ER, follow up with her PCP or go to [facility] on [facility] for evaluation and treatment. Patient states that she has to go to work right now and will follow up afterwards. She was advised to seek medical attention should the ecchymosis increase in size, the finger becomes more painful or she experiences numbness or tingling in her finger. Patient verbalized understanding and is in agreement.   ?  ruptured cyst of ovary    Patient denies any CP, palpitations, SOB, cough, or dysuria. No recent URI or UTI.  Stated exercise tolerance is FOS 2-3  SRINATH screen reviewed    Patient denies any recent personal exposure to COVID19. Denies any sick contacts. Patient denies travel within the past 30 days. Patient was instructed to quarantine until after procedure.    Anesthesia Alert  YES--Difficult Airway - CLASS IV  NO--History of neck surgery or radiation  NO--Limited ROM of neck  NO--History of Malignant hyperthermia  NO--Personal or family history of Pseudocholinesterase deficiency.  YES--Prior Anesthesia Complication - PONV  NO--Latex Allergy  NO--Loose teeth  NO--History of Rheumatoid Arthritis  NO--SRINATH  NO--Bleeding risk  YES--Other - RIGHT ARM PRECAUTIONS    Patient states that this is their complete medical history and list of medications.  Patient verbalized understanding of instructions given during this visit and was given the opportunity to ask questions and have them answered. They were instructed to follow up with their surgeon/surgeon's office with any questions regarding their procedure.

## 2023-08-05 DIAGNOSIS — Z01.818 ENCOUNTER FOR OTHER PREPROCEDURAL EXAMINATION: ICD-10-CM

## 2023-08-05 DIAGNOSIS — N85.9 NONINFLAMMATORY DISORDER OF UTERUS, UNSPECIFIED: ICD-10-CM

## 2023-08-17 NOTE — ASU PATIENT PROFILE, ADULT - FALL HARM RISK - UNIVERSAL INTERVENTIONS
Bed in lowest position, wheels locked, appropriate side rails in place/Call bell, personal items and telephone in reach/Instruct patient to call for assistance before getting out of bed or chair/Non-slip footwear when patient is out of bed/[place] to call system/Physically safe environment - no spills, clutter or unnecessary equipment/Purposeful Proactive Rounding/Room/bathroom lighting operational, light cord in reach

## 2023-08-17 NOTE — ASU PATIENT PROFILE, ADULT - PATIENT'S HEIGHT AND WEIGHT RECORDED IN THE VITAL SIGNS FLOWSHEET
Patient Name: Tracey Gardner  Patient : 1947  Patient MRN: [id number]     Primary Oncologist: iRka Weston MD  Referring Provider: Abdon Gross MD     Date of Service: 2022      Chief Complaint:   No chief complaint on file. Patient Active Problem List:     Recurrent deep venous thrombosis      Splenomegaly     JAK2 positive myeloproliferative neoplasm    HPI:   Tracey Hayes is a 55-year-old pleasant gentleman with medical history significant for hypertension, GERD and recurrent VTE, currently on long term anticoagulation therapy with Xarelto, initially referred to me on 2020 for evaluation of his splenomegaly. He stated that he presented to [facility] with lower abdominal pain and constipation. He was found to have impacted stool and he was treated with soapsuds enema. His symptom resolved after he had bowel movement. Because of his history of small bowel obstruction, CT scan was ordered. It showed splenic enlargement with heterogeneous enhancement, along with wedge-shaped areas of hypo enhancement. The enlargement is nonspecific, but can be reactive to inflammation or infection elsewhere. However, neoplasm (especially lymphoma) must also be considered. The areas of wedge-shaped hypoenhancement may be secondary to the reactive process, but superimposed necrosis/infarction is more likely. He was released from hospital with an appointment to see me as an out patient. He has about 35 pound weight loss over one year duration. Laboratory work ups done on 10/16/20 showed mild leukocytosis (WBC 14.1) and JAK2 V617F mutation, consistent with JAK2 positive myeloproliferative neoplasm. The rests of the blood test, including flow cytometry, JAK2 exon 12-15, MPL, CALR, SPEP, serum immunofixation, ESR and LDH were within normal range. On 2022, he presented to me for follow up.   I have been following him for JAK2 positive myeloproliferative neoplasm and he required therapeutic phlebotomy since diagnosis. I believe his splenomegaly is due to JAK2 positive myeloproliferative neoplasm. The areas of wedge-shaped hypoenhancement are due to splenic infarction, secondary to JAK2 related hypercoagulable state. He is currently on Xarelto 20 mg daily and I added aspirin 81 mg daily since 10/30/20 to prevent both arterial and venous thromboembolic episodes. I recommend him to have therapeutic phlebotomy to keep his hematocrit less than 45%. Since his hematocrit today was 43.3%, he doesn't need phlebotomy this time. Since he has JAK2 positive polycythemia vera with persistently elevated white blood cell count, I started hydroxyurea 500 mg every other day on 10/6/2021. However, he developed hydroxyurea induced skin rashes and we have to stop it soon after starting it. Since he could not tolerate hydroxyurea, I will continue with therapeutic phlebotomy as needed basis only. Health maintenance - I recommend exercise, low fat and low sodium diet. He doesn't have any significant symptoms at today visit. Patient's previous history of VTE  He reported that he hit his left leg to the side of his truck and then developed a LLE DVT in 2014 when he was placed on Coumadin. He was on it for 6 months. It looks like he developed RLE(does not remember any provoking factors) in 2015 when he was placed on coumadin which was switched to Xarelto prior to his PCP retired. Reported that in November 2017 he was off of Xarelto for 7 days for teeth extraction. Developed RLE DVT. Xarelto resumed and and has been very compliant with it since. On March 3, 2019, he presented with right calf pain and swelling. Doppler showed not compressible in the region of popliteal vein and distal femoral vein which suggests DVT. He was switched to heparin, transition to coumadin.  Later on, he was switched back to xarelto since he has difficulty achiever continuous therapeutic INR. (also wasn't sure doppler done on 3/2019 showed chronic or acute DVT). He has been on xarelto since then. Hypercoagulable work ups done on 3/10/2019 showed anticardiolipin antibodies - negative, beta-2 glycoprotein antibodies - negative, protein C - 119, protein S - 75, factor V Leiden - negative, prothrombin gene mutation - negative, homocysteine - 19.1, MTHFR - heterozygous for [id number]. Past Medical History  History of DVT  Hypertension  Herpes zoster  GERD    Surgical History  Cholecystectomy   Laparoscopic appendectomy  Mandible fracture surgery    Allergies  allopurinol    Social History  Retired 12 years ago, worked at [employer]. No tob/etoh or any other illicit drug abuse. Family History  Reported that brother had a history of malignancy but was not sure what kind of malignancy. Review of Systems: \"Per interval history; otherwise 10 point ROS is negative. \"  His energy level is stable and his sleep is good. He doesn't have fever, chills, night sweats, cough, shortness of breath, chest pain, hemoptysis or palpitations. His bowel and bladder functions are malia. He denies nausea, vomiting, abdominal pain, diarrhea, constipation, dysuria, loss of appetite or weight loss. He doesn't have neuropathy and he denies bleeding or clotting issues. He doesn't have any pain on today visit. Denies anxiety or depression. The rest of the systems are unremarkable. Vital Signs: There were no vitals taken for this visit.     Physical Exam:  CONSTITUTIONAL: awake, no apparent distress, alert, cooperative,    EYES: pupils equal, sclera clear and conjunctiva normal, round and reactive to light,   ENT: without obvious abnormality, normocephalic, atraumatic  NECK: supple, symmetrical, no jugular venous distension and no carotid bruits   HEMATOLOGIC/LYMPHATIC: no cervical, supraclavicular or axillary lymphadenopathy   LUNGS: VBS, no wheezes, no increased work of breathing, no crackles, clear to auscultation, no rhonchi,   CARDIOVASCULAR: normal S1 and S2, regular rate and rhythm, no murmur noted  ABDOMEN: soft, non-distended, non-tender, normal bowel sounds x 4, no masses palpated, no hepatosplenomegaly   MUSCULOSKELETAL: full range of motion noted, tone is normal  NEUROLOGIC: awake, alert, oriented to name, place and time. Motor skills grossly intact. SKIN: Normal skin color, texture, turgor and no jaundice.  appears intact   EXTREMITIES: no leg swelling, no cyanosis, no LE edema, no clubbing,       Labs:  Hematology:  Lab Results   Component Value Date    WBC 17.2 (H) 01/06/2022    RBC 6.14 01/06/2022    HGB 15.0 01/06/2022    HCT 47.9 01/06/2022    MCV 78.0 01/06/2022    MCH 24.4 (L) 01/06/2022    MCHC 31.3 (L) 01/06/2022    RDW 19.7 (H) 01/06/2022     01/06/2022    MPV 9.5 01/06/2022    BANDSPCT 3 (L) 08/11/2020    SEGSPCT 78.0 (H) 01/06/2022    EOSRELPCT 5.4 (H) 01/06/2022    BASOPCT 3.3 (H) 01/06/2022    LYMPHOPCT 7.0 (L) 01/06/2022    MONOPCT 6.3 (H) 01/06/2022    BANDABS 0.50 08/11/2020    SEGSABS 13.4 01/06/2022    EOSABS 0.9 01/06/2022    BASOSABS 0.6 01/06/2022    LYMPHSABS 1.2 01/06/2022    MONOSABS 1.1 01/06/2022    DIFFTYPE AUTOMATED DIFFERENTIAL 01/06/2022    PLTM SEVERAL LARGE PLATELETS 59/75/3121     Lab Results   Component Value Date    ESR 2 01/06/2022     Chemistry:  Lab Results   Component Value Date     01/06/2022    K 4.1 01/06/2022    CL 99 01/06/2022    CO2 27 01/06/2022    BUN 11 01/06/2022    CREATININE 1.0 01/06/2022    GLUCOSE 116 (H) 01/06/2022    CALCIUM 8.9 01/06/2022    PROT 6.8 01/06/2022    LABALBU 4.1 01/06/2022    BILITOT 1.7 (H) 01/06/2022    ALKPHOS 125 01/06/2022    AST 38 (H) 01/06/2022    ALT 46 (H) 01/06/2022    LABGLOM >60 01/06/2022    GFRAA >60 01/06/2022    MG 2.2 08/10/2020    POCGLU 124 (H) 06/02/2017     Lab Results   Component Value Date     (H) 01/06/2022    HOMOCYSTEINE 19.1 (H) 03/10/2019     No components found for: LD  Lab Results   Component Value Date    TSHHS 0.591 07/12/2018     Immunology:  Lab Results   Component Value Date    PROT 6.8 01/06/2022    SPEP  10/16/2020     INTERPRETATION - No monoclonal proteins identified. SAF    ALBUMINELP 3.4 10/16/2020    LABALPH 0.3 10/16/2020    LABALPH 0.7 10/16/2020    LABBETA 0.9 10/16/2020    GAMGLOB 0.9 10/16/2020     Lab Results   Component Value Date    KAPPAUVOL 28.82 (H) 10/16/2020    LAMBDAUVOL 19.74 10/16/2020    KLFLCR 1.46 10/16/2020     Lab Results   Component Value Date    B2M 4.1 (H) 10/16/2020     Coagulation Panel:  Lab Results   Component Value Date    PROTIME 16.2 (H) 08/10/2020    INR 1.33 08/10/2020    APTT 36.4 08/10/2020    DDIMER 837 (H) 08/10/2020     Anemia Panel:  No results found for: LEBFPLHF91, FOLATE  Tumor Markers:  No results found for: , CEA, , LABCA2, PSA  Observations:  No data recorded     Assessment & Plan:   Recurrent VTE  Splenomegaly  JAK2 V617F myeloproliferative neoplasm    PLAN  Padmaja Smith is a 53-year-old pleasant gentleman with medical history significant for recurrent VTE, who was found to have splenomegaly when he presented with lower abdominal pain and constipation. CT scan showed splenic enlargement with heterogeneous enhancement, along with wedge-shaped areas of hypo enhancement. Laboratory work ups done on 10/16/20 showed mild leukocytosis (WBC 14.1) and JAK2 V617F mutation, consistent with JAK2 positive myeloproliferative neoplasm. The rests of the blood test, including flow cytometry, JAK2 exon 12-15, MPL, CALR, SPEP, serum immunofixation, ESR and LDH were within normal range. On April 27, 2022, he presented to me for follow up. I have been following him for JAK2 positive myeloproliferative neoplasm and he required therapeutic phlebotomy since diagnosis. I believe his splenomegaly is due to JAK2 positive myeloproliferative neoplasm.  The areas of wedge-shaped hypoenhancement are due to splenic infarction, secondary to JAK2 related hypercoagulable state. He is currently on Xarelto 20 mg daily and I added aspirin 81 mg daily since 10/30/20 to prevent both arterial and venous thromboembolic episodes. I recommend him to have therapeutic phlebotomy to keep his hematocrit less than 45%. Since his hematocrit today was 43.3%, he doesn't need phlebotomy this time. Since he has JAK2 positive polycythemia vera with persistently elevated white blood cell count, I started hydroxyurea 500 mg every other day on 10/6/2021. However, he developed hydroxyurea induced skin rashes and we have to stop it soon after starting it. Since he could not tolerate hydroxyurea, I will continue with therapeutic phlebotomy as needed basis only. Health maintenance - I recommend exercise, low fat and low sodium diet. I answered all his questions and concerns for today. I recommend him to follow up with primary care physician, Dr. Flaco Ivey on regular basis. Recent imaging and labs were reviewed and discussed with the patient. yes

## 2023-08-18 ENCOUNTER — OUTPATIENT (OUTPATIENT)
Dept: OUTPATIENT SERVICES | Facility: HOSPITAL | Age: 53
LOS: 1 days | Discharge: HOME | End: 2023-08-18
Payer: COMMERCIAL

## 2023-08-18 ENCOUNTER — TRANSCRIPTION ENCOUNTER (OUTPATIENT)
Age: 53
End: 2023-08-18

## 2023-08-18 ENCOUNTER — RESULT REVIEW (OUTPATIENT)
Age: 53
End: 2023-08-18

## 2023-08-18 ENCOUNTER — APPOINTMENT (OUTPATIENT)
Dept: OBGYN | Facility: HOSPITAL | Age: 53
End: 2023-08-18

## 2023-08-18 VITALS
DIASTOLIC BLOOD PRESSURE: 75 MMHG | WEIGHT: 199.96 LBS | HEIGHT: 66 IN | TEMPERATURE: 98 F | RESPIRATION RATE: 15 BRPM | HEART RATE: 74 BPM | SYSTOLIC BLOOD PRESSURE: 120 MMHG | OXYGEN SATURATION: 99 %

## 2023-08-18 VITALS
RESPIRATION RATE: 12 BRPM | HEART RATE: 65 BPM | TEMPERATURE: 98 F | OXYGEN SATURATION: 100 % | SYSTOLIC BLOOD PRESSURE: 133 MMHG | DIASTOLIC BLOOD PRESSURE: 75 MMHG

## 2023-08-18 DIAGNOSIS — Z98.891 HISTORY OF UTERINE SCAR FROM PREVIOUS SURGERY: Chronic | ICD-10-CM

## 2023-08-18 DIAGNOSIS — Z90.11 ACQUIRED ABSENCE OF RIGHT BREAST AND NIPPLE: Chronic | ICD-10-CM

## 2023-08-18 DIAGNOSIS — N85.9 NONINFLAMMATORY DISORDER OF UTERUS, UNSPECIFIED: ICD-10-CM

## 2023-08-18 LAB
GLUCOSE BLDC GLUCOMTR-MCNC: 108 MG/DL — HIGH (ref 70–99)
GLUCOSE BLDC GLUCOMTR-MCNC: 130 MG/DL — HIGH (ref 70–99)

## 2023-08-18 PROCEDURE — 88305 TISSUE EXAM BY PATHOLOGIST: CPT | Mod: 26

## 2023-08-18 PROCEDURE — 88305 TISSUE EXAM BY PATHOLOGIST: CPT

## 2023-08-18 PROCEDURE — 82962 GLUCOSE BLOOD TEST: CPT

## 2023-08-18 PROCEDURE — 58561 HYSTEROSCOPY REMOVE MYOMA: CPT

## 2023-08-18 RX ORDER — TAMOXIFEN CITRATE 20 MG/1
1 TABLET, FILM COATED ORAL
Qty: 0 | Refills: 0 | DISCHARGE

## 2023-08-18 RX ORDER — HYDROMORPHONE HYDROCHLORIDE 2 MG/ML
0.5 INJECTION INTRAMUSCULAR; INTRAVENOUS; SUBCUTANEOUS
Refills: 0 | Status: DISCONTINUED | OUTPATIENT
Start: 2023-08-18 | End: 2023-08-18

## 2023-08-18 RX ORDER — METFORMIN HYDROCHLORIDE 850 MG/1
1 TABLET ORAL
Refills: 0 | DISCHARGE

## 2023-08-18 RX ORDER — OXYCODONE AND ACETAMINOPHEN 5; 325 MG/1; MG/1
2 TABLET ORAL ONCE
Refills: 0 | Status: DISCONTINUED | OUTPATIENT
Start: 2023-08-18 | End: 2023-08-18

## 2023-08-18 RX ORDER — SODIUM CHLORIDE 9 MG/ML
1000 INJECTION, SOLUTION INTRAVENOUS
Refills: 0 | Status: DISCONTINUED | OUTPATIENT
Start: 2023-08-18 | End: 2023-08-18

## 2023-08-18 RX ORDER — MEPERIDINE HYDROCHLORIDE 50 MG/ML
12.5 INJECTION INTRAMUSCULAR; INTRAVENOUS; SUBCUTANEOUS ONCE
Refills: 0 | Status: DISCONTINUED | OUTPATIENT
Start: 2023-08-18 | End: 2023-08-18

## 2023-08-18 RX ORDER — ONDANSETRON 8 MG/1
4 TABLET, FILM COATED ORAL ONCE
Refills: 0 | Status: DISCONTINUED | OUTPATIENT
Start: 2023-08-18 | End: 2023-08-18

## 2023-08-18 RX ORDER — METFORMIN HYDROCHLORIDE 850 MG/1
1 TABLET ORAL
Qty: 0 | Refills: 0 | DISCHARGE

## 2023-08-18 NOTE — ASU DISCHARGE PLAN (ADULT/PEDIATRIC) - NS MD DC FALL RISK RISK
For information on Fall & Injury Prevention, visit: [email or web address] OR  [email or web address] OR  https://www.cdc.gov/steadi/patient.html

## 2023-08-18 NOTE — BRIEF OPERATIVE NOTE - OPERATION/FINDINGS
Exam under anesthesia revealed an anteverted uterus. First degree cystocele seen in vagina, cervix appeared normal but with some friability. Hysteroscopy showed: normal cervical tissue, multiple small polyps visualized. Bilateral ostia visualized and appear normal. Polyps sent for pathology.

## 2023-08-18 NOTE — ASU DISCHARGE PLAN (ADULT/PEDIATRIC) - NO DOUCHING DURATION
Patient Name: Alisha Connolly   YOB: 1960  Referring Primary Care Physician: Aarti Ogden APRN  BMI: Body mass index is 36.67 kg/m².    Chief Complaint:    Chief Complaint   Patient presents with   • Left Knee - Follow-up        HPI:     Alisha Connolly is a 63 y.o. female who presents today for evaluation of   Chief Complaint   Patient presents with   • Left Knee - Follow-up   .  Alisha is seen today with her .  She has had some orthopedic problems over the last couple months.  In December when she was living in [facility] she had a fall and fractured right ankle she was in a boot for 3 to 4 weeks and then transitioned out of it in early January.  Couple weeks after that she had a fall on the steps with grandchildren dogs etc. going up and down.  She injured her left knee and saw Toma who put her in a brace a couple weeks ago failing to get better they ordered a MRI and sent her here today.  She also has an episode where she felt a loud pop in the left knee.  He is using crutch crutches to get around.      Subjective   Medications:   Home Medications:  Current Outpatient Medications on File Prior to Visit   Medication Sig   • acetaminophen (TYLENOL) 500 MG tablet Take 500 mg by mouth Every 6 (Six) Hours As Needed for Mild Pain.   • albuterol sulfate  (90 Base) MCG/ACT inhaler Inhale 2 puffs Every 4 (Four) Hours As Needed.   • aspirin 81 MG EC tablet Take 81 mg by mouth Daily. HOLDING FOR SURGERY   • cholecalciferol (VITAMIN D3) 25 MCG (1000 UT) tablet Take 1,000 Units by mouth Daily. HOLDING FOR SURGERY   • DHA-EPA-Vitamin E 192-251-11 MG-MG-UNIT capsule Take 1 tablet by mouth. HOLDING FOR SURGERY   • Estradiol Acetate (Femring) 0.05 MG/24HR ring Insert 1 each into the vagina. CHANGES EVERY 3 MONTHS   • Lancets (OneTouch Delica Plus [id number]) misc 1 each by Other route Daily.   • lisinopril (PRINIVIL,ZESTRIL) 40 MG tablet Take 1 tablet by mouth Daily.   • metFORMIN (GLUCOPHAGE)  500 MG tablet Take 1 tablet by mouth 2 (Two) Times a Day With Meals.   • naproxen (Naprosyn) 500 MG tablet Take 1 tablet by mouth 2 (Two) Times a Day With Meals.   • OneTouch Ultra test strip 1 each by Other route Daily.     No current facility-administered medications on file prior to visit.     Current Medications:  Scheduled Meds:  Continuous Infusions:No current facility-administered medications for this visit.    PRN Meds:.    I have reviewed the patient's medical history in detail and updated the computerized patient record.  Review and summarization of old records includes:    Past Medical History:   Diagnosis Date   • Abnormal Pap smear of cervix    • Age-related nuclear cataract of both eyes 02/03/2020   • Anal fissure 10/2021   • Anal skin tag 08/2022    S/P EXCISION   • Anal skin tag    • Ankle fracture 12/2022    RIGHT   • Arthrodesis status 03/08/2014   • Asthma    • Benign essential hypertension 06/05/2015    Continue lisinopril   • Carpal tunnel syndrome    • Cervical spondylolysis    • Chronic neck pain 07/19/2016   • Closed nondisplaced fracture of lateral malleolus of right fibula 12/10/2022    NO SURGERY, SEEN AT [facility]   • Cystitis 11/2017   • Dry eye syndrome 02/03/2020   • Environmental allergies    • Exogenous hypertriglyceridemia 06/05/2017   • Ganglion cyst of finger 01/2020   • Hemorrhoid    • Hemorrhoids    • History of COVID-19 05/2022   • Hormone replacement therapy (HRT)    • Hyperchylomicronemia    • Hyperlipidemia 06/05/2015    ASCVD 10-year risk ~10% Plan: · Start statin. Unclear whether arm numbness really 2/2 statin in the past.   • Hyperopia of both eyes 02/03/2020   • Keratoconjunctivitis sicca (HCC) 02/03/2020   • Knee pain, left    • Lichen sclerosus    • Lung nodule 06/01/2006   • Obstructive sleep apnea syndrome 11/01/2004    Not using CPAP due to eye issues   • Palpitations    • Pertussis 02/08/2016    [facility]   • PNA (pneumonia)    • PONV (postoperative nausea and  vomiting)    • Presbyopia 2020   • Rotator cuff injury 2011    SEEN AT [facility] ER   • Seasonal allergies    • Slow to wake up after anesthesia    • Snoring    • Type 2 diabetes mellitus without complication (HCC) 2015    Under good control Plan: · Reassured about use of metformin   • Urinary urgency 2021        Past Surgical History:   Procedure Laterality Date   • ABDOMINAL HYSTERECTOMY     • ANTERIOR CERVICAL DISCECTOMY W/ FUSION N/A 2014    C6-7, DR. ALEXEY IZAGUIRRE AT [facility]. TITANIUM PLATE   • ANUS SURGERY N/A 2022    Procedure: ANAL TAG EXCISION;  Surgeon: Bradley Santiago MD;  Location:  [facility];  Service: General;  Laterality: N/A;  pre-anal tag  post-same   • CERVICAL SPINE SURGERY      TITANIUM PLATE   •  SECTION     • COLONOSCOPY N/A    • COLONOSCOPY N/A 2022    EXTERNAL AND INTERNAL HEMORRHOIDS, GRADE 3-BANDED, RESCOPE IN 10 YRS, DR. BRADLEY SANTIAGO AT [facility]   • HEMORRHOIDECTOMY N/A 2022    Procedure: HEMORRHOID BANDING x3;  Surgeon: Bradley Santiago MD;  Location:  [facility];  Service: General;  Laterality: N/A;  pre-hemorrhoids  post-same   • HYSTERECTOMY N/A     WITH BSO, FOR HEAVY BLEEDING   • INCISIONAL BREAST BIOPSY Right 1980    BENIGN   • LAPAROSCOPIC LYSIS OF ADHESIONS N/A    • OOPHORECTOMY     • OOPHORECTOMY     • SPHINCTEROTOMY N/A 2023    Procedure: EXAM UNDER ANESTHESIA, CHEMICAL SPHINCTEROTOMY WITH BOTOX;  Surgeon: Bradley Santiago MD;  Location: [facility] MAIN OR;  Service: General;  Laterality: N/A;        Social History     Occupational History   • Not on file   Tobacco Use   • Smoking status: Former     Packs/day: 0.25     Years: 4.50     Pack years: 1.13     Types: Cigarettes     Quit date: 3/7/2014     Years since quittin.9     Passive exposure: Past   • Smokeless tobacco: Never   Vaping Use   • Vaping Use: Never used   Substance and Sexual Activity   • Alcohol use: Yes     Comment: occ   • Drug use: Never   •  "Sexual activity: Yes     Partners: Male     Birth control/protection: Post-menopausal, Hysterectomy      Social History     Social History Narrative   • Not on file        Family History   Problem Relation Age of Onset   • Arthritis Mother    • Heart disease Mother    • Arrhythmia Mother    • Hyperlipidemia Father    • Diabetes Father    • Heart disease Father    • Coronary artery disease Father    • Diabetes Sister    • Diabetes Paternal Grandmother    • Cancer Cousin    • Breast cancer Cousin    • Cancer Maternal Great-Grandmother    • Ovarian cancer Maternal Great-Grandmother    • Malig Hyperthermia Neg Hx        ROS: 14 point review of systems was performed and all other systems were reviewed and are negative except for documented findings in HPI and today's encounter.     Allergies:   Allergies   Allergen Reactions   • Penicillins Hives   • Atorvastatin Paresthesia     Arm numbness           • Rosuvastatin Paresthesia     Arm numbness     • Latex Rash   • Tetanus Toxoid, Adsorbed Rash     Constitutional:  Denies fever, shaking or chills   Eyes:  Denies change in visual acuity   HENT:  Denies nasal congestion or sore throat   Respiratory:  Denies cough or shortness of breath   Cardiovascular:  Denies chest pain or severe LE edema   GI:  Denies abdominal pain, nausea, vomiting, bloody stools or diarrhea   Musculoskeletal:  Numbness, tingling, pain, or loss of motor function only as noted above in history of present illness.  : Denies painful urination or hematuria  Integument:  Denies rash, lesion or ulceration   Neurologic:  Denies headache or focal weakness  Endocrine:  Denies lymphadenopathy  Psych:  Denies confusion or change in mental status   Hem:  Denies active bleeding    OBJECTIVE:  Physical Exam: 63 y.o. female  Wt Readings from Last 3 Encounters:   02/17/23 93.9 kg (207 lb)   02/06/23 94 kg (207 lb 3.2 oz)   02/01/23 93 kg (205 lb)     Ht Readings from Last 1 Encounters:   02/17/23 160 cm (63\") " "    Body mass index is 36.67 kg/m².  Vitals:    02/17/23 0848   Temp: 97.8 °F (36.6 °C)     Vital signs reviewed.     General Appearance:    Alert, cooperative, in no acute distress                  Eyes: conjunctiva clear  ENT: external ears and nose atraumatic  CV: no peripheral edema  Resp: normal respiratory effort  Skin: no rashes or wounds; normal turgor  Psych: mood and affect appropriate  Lymph: no nodes appreciated  Neuro: gross sensation intact  Vascular:  Palpable peripheral pulse in noted extremity  Musculoskeletal Extremities: Exam pleasant lady she does have some swelling in her left knee and some joint line tenderness on the left.  Mild pes anserine tenderness as well she has pretty good range of motion with repeated Javier's test do not cause a sharp increase in her symptoms.  She is stiff and apprehensive in the knee however and she feels like she has a Baker's cyst today.    Radiology:   Reviewing her x-ray of her knee AP lateral nonweightbearing x-rays taken at the hospital without comparison views of her left knee show evidence of tricompartmental arthritis.  There are small osteophytes there are some subchondral sclerosis on the lateral you can see irregularity along the medial femoral condyle and patellofemoral spurring.  There is only 2 views AP lateral and they are nonweightbearing.  She also has an MRI that was taken and the report shows evidence of a \"subtle\" posterior root meniscal tear.  Does not appear to be displaced etc.  Importantly they see no evidence of fracture.  Describes a discoid lateral meniscus where she is not having symptoms.  Says there is \"minimal effusion but clinically she has a Baker's cyst today may have increased since the MRI about a week and a half ago.  This is discussed with the patient as well as the radiologist comment of \"minimal\" arthritic change at least by radiographic criteria.  Is evidence of more than that on these nonweightbearing " "films        Assessment:     ICD-10-CM ICD-9-CM   1. Arthritis of left knee  M17.12 716.96   2. Knee strain, left, initial encounter  S86.912A 844.9        MDM/Plan:   The diagnosis(es), natural history, pathophysiology and treatment for diagnosis(es) were discussed. Opportunity given and questions answered.  Biomechanics of pertinent body areas discussed.  When appropriate, the use of ambulatory aids discussed.    Biomechanics of pertinent body areas discussed.  When appropriate, the use of ambulatory aids discussed.  BMI:  The concept of BMI body mass index and its importance and implications discussed.    Inflammation/pain control; with cold, heat, elevation and/or liniments discussed as appropriate  Cortisone Injection. See procedure note.  MEDICAL RECORDS reviewed from other provider(s) for past and current medical history pertinent to this complaint.  I believe she exacerbated some underlying arthritis we will try corticosteroid injection as she has not had that.  She use ice heat liniments and the like.  He can use over-the-counter anti-inflammatories or Tylenol as long as she has no contraindications.  We will see if the injection helps if it fails to get better may consider the possibility of physical therapy.  Said an appointment about 3 weeks possibilities if she is not getting better would include physical therapy or other work-ups.  With the description of meniscal tear findings on clinical exam I do not feel that the \"meniscus tear\" is primarily responsible for her symptoms at this time.    2/17/2023    Dictated utilizing Dragon dictation    " 2 weeks

## 2023-08-18 NOTE — ASU DISCHARGE PLAN (ADULT/PEDIATRIC) - CARE PROVIDER_API CALL
Elías Madrigal  Obstetrics and Gynecology  [facility] [address]  Phone: [phone]  Fax: [phone]  Follow Up Time: 2 weeks

## 2023-08-18 NOTE — CHART NOTE - NSCHARTNOTEFT_GEN_A_CORE
PACU ANESTHESIA ADMISSION NOTE      Procedure:   Post op diagnosis:      ____  Intubated  TV:______       Rate: ______      FiO2: ______    __x__  Patent Airway    _x___  Full return of protective reflexes    _x___  Full recovery from anesthesia / back to baseline status    Vitals:  temp(F) 98  /88  spo2 98  RR 17  pulse 87    Mental Status:  _x___ Awake   _____ Alert   _____ Drowsy   _____ Sedated    Nausea/Vomiting:  _x___ NO  ______Yes,   See Post - Op Orders          Pain Scale (0-10):  _____    Treatment: ____ None    __x__ See Post - Op/PCA Orders    Post - Operative Fluids:   ____ Oral   ____x See Post - Op Orders    Plan: Discharge:   x ____Home       _____Floor     _____Critical Care    _____  Other:_________________    Comments: uneventful anesthesia course no complications. Vitals stable. Pt transferred to PACU

## 2023-08-21 ENCOUNTER — NON-APPOINTMENT (OUTPATIENT)
Age: 53
End: 2023-08-21

## 2023-08-21 LAB — SURGICAL PATHOLOGY STUDY: SIGNIFICANT CHANGE UP

## 2023-08-23 ENCOUNTER — NON-APPOINTMENT (OUTPATIENT)
Age: 53
End: 2023-08-23

## 2023-08-23 DIAGNOSIS — Z79.84 LONG TERM (CURRENT) USE OF ORAL HYPOGLYCEMIC DRUGS: ICD-10-CM

## 2023-08-23 DIAGNOSIS — Z85.3 PERSONAL HISTORY OF MALIGNANT NEOPLASM OF BREAST: ICD-10-CM

## 2023-08-23 DIAGNOSIS — N95.0 POSTMENOPAUSAL BLEEDING: ICD-10-CM

## 2023-08-23 DIAGNOSIS — Z90.11 ACQUIRED ABSENCE OF RIGHT BREAST AND NIPPLE: ICD-10-CM

## 2023-08-23 DIAGNOSIS — N85.4 MALPOSITION OF UTERUS: ICD-10-CM

## 2023-08-23 DIAGNOSIS — E11.9 TYPE 2 DIABETES MELLITUS WITHOUT COMPLICATIONS: ICD-10-CM

## 2023-08-23 DIAGNOSIS — N84.0 POLYP OF CORPUS UTERI: ICD-10-CM

## 2024-02-21 ENCOUNTER — APPOINTMENT (OUTPATIENT)
Dept: OBGYN | Facility: CLINIC | Age: 54
End: 2024-02-21

## 2024-04-22 ENCOUNTER — NON-APPOINTMENT (OUTPATIENT)
Age: 54
End: 2024-04-22

## 2024-04-22 ENCOUNTER — APPOINTMENT (OUTPATIENT)
Dept: OBGYN | Facility: CLINIC | Age: 54
End: 2024-04-22
Payer: COMMERCIAL

## 2024-04-22 VITALS
BODY MASS INDEX: 30.86 KG/M2 | HEIGHT: 66 IN | SYSTOLIC BLOOD PRESSURE: 120 MMHG | WEIGHT: 192 LBS | HEART RATE: 73 BPM | DIASTOLIC BLOOD PRESSURE: 79 MMHG

## 2024-04-22 LAB
BILIRUB UR QL STRIP: NEGATIVE
CLARITY UR: CLEAR
COLLECTION METHOD: NORMAL
GLUCOSE UR-MCNC: NEGATIVE
HCG UR QL: 0.2 EU/DL
HGB UR QL STRIP.AUTO: ABNORMAL
KETONES UR-MCNC: NEGATIVE
LEUKOCYTE ESTERASE UR QL STRIP: NORMAL
NITRITE UR QL STRIP: NEGATIVE
PH UR STRIP: 5.5
PROT UR STRIP-MCNC: NEGATIVE
SP GR UR STRIP: 1.02

## 2024-04-22 PROCEDURE — 99459 PELVIC EXAMINATION: CPT

## 2024-04-22 PROCEDURE — 99213 OFFICE O/P EST LOW 20 MIN: CPT | Mod: 25

## 2024-04-22 PROCEDURE — 76830 TRANSVAGINAL US NON-OB: CPT

## 2024-04-22 PROCEDURE — 81003 URINALYSIS AUTO W/O SCOPE: CPT | Mod: QW

## 2024-04-22 NOTE — PROCEDURE
[Abnormal Uterine Bleeding] : abnormal uterine bleeding [Transvaginal Ultrasound] : transvaginal ultrasound [L: ___ cm] : L: [unfilled] cm [W: ___cm] : W: [unfilled] cm [H: ___ cm] : H: [unfilled] cm [FreeTextEntry9] : post menopausal bleeding [FreeTextEntry5] : EMS very thickened, cystic spaces, abnormal appearance [FreeTextEntry7] : 1.44cc [FreeTextEntry8] : 0.50cc

## 2024-04-22 NOTE — PHYSICAL EXAM
[Chaperone Present] : A chaperone was present in the examining room during all aspects of the physical examination [[id number]] : A chaperone was present during the pelvic exam. [FreeTextEntry2] : ROXY Renteria [Appropriately responsive] : appropriately responsive [No Acute Distress] : no acute distress [Oriented x3] : oriented x3 [Labia Majora] : normal [Labia Minora] : normal [Scant] : There was scant vaginal bleeding [Normal] : normal [Uterine Adnexae] : normal

## 2024-04-22 NOTE — PLAN
[FreeTextEntry1] : D&C hysteroscopy to be scheduled by office will speak with Dr Madrigal for location of procedure , had symphion with D&C in august 2023

## 2024-04-29 ENCOUNTER — NON-APPOINTMENT (OUTPATIENT)
Age: 54
End: 2024-04-29

## 2024-05-07 ENCOUNTER — APPOINTMENT (OUTPATIENT)
Dept: OBGYN | Facility: CLINIC | Age: 54
End: 2024-05-07
Payer: COMMERCIAL

## 2024-05-07 VITALS
HEIGHT: 66 IN | DIASTOLIC BLOOD PRESSURE: 82 MMHG | WEIGHT: 188 LBS | BODY MASS INDEX: 30.22 KG/M2 | SYSTOLIC BLOOD PRESSURE: 122 MMHG | HEART RATE: 73 BPM | TEMPERATURE: 98 F

## 2024-05-07 DIAGNOSIS — N95.0 POSTMENOPAUSAL BLEEDING: ICD-10-CM

## 2024-05-07 DIAGNOSIS — Z78.9 OTHER SPECIFIED HEALTH STATUS: ICD-10-CM

## 2024-05-07 DIAGNOSIS — N85.9 NONINFLAMMATORY DISORDER OF UTERUS, UNSPECIFIED: ICD-10-CM

## 2024-05-07 LAB
HCG UR QL: NEGATIVE
QUALITY CONTROL: YES

## 2024-05-07 PROCEDURE — 81025 URINE PREGNANCY TEST: CPT

## 2024-05-07 PROCEDURE — 58558Z: CUSTOM

## 2024-05-07 RX ORDER — ATORVASTATIN CALCIUM 80 MG/1
TABLET, FILM COATED ORAL
Refills: 0 | Status: ACTIVE | COMMUNITY

## 2024-05-07 RX ORDER — METFORMIN HYDROCHLORIDE 625 MG/1
TABLET ORAL
Refills: 0 | Status: ACTIVE | COMMUNITY

## 2024-05-07 NOTE — PROCEDURE
[Hysteroscopy] : Hysteroscopy [Time out performed] : Pre-procedure time out performed.  Patient's name, date of birth and procedure confirmed. [Consent Obtained] : Consent obtained [Postmenopausal bleeding] : postmenopausal bleeding [Risks] : risks [Benefits] : benefits [Alternatives] : alternatives [Patient] : patient [Infection] : infection [Bleeding] : bleeding [Allergic Reaction] : allergic reaction [rigid] : Using aseptic technique a hysteroscopy was performed using a rigid hysteroscope [Sent to Pathology] : specimen was placed in buffered formalin and sent for pathology [Antibiotics given] : antibiotics not given [Hemostasis obtained] : hemostasis obtained [Tolerated Well] : Patient tolerated the procedure well [Aftercare instructions/regstrictions given and follow-up scheduled] : Aftercare instructions/restrictions given and follow-up scheduled [de-identified] : sounded to 9cm moderate polypoid tissue eagle dilators medium sized curette

## 2024-05-08 ENCOUNTER — NON-APPOINTMENT (OUTPATIENT)
Age: 54
End: 2024-05-08

## 2024-05-12 LAB — CORE LAB BIOPSY: NORMAL

## 2024-05-13 ENCOUNTER — NON-APPOINTMENT (OUTPATIENT)
Age: 54
End: 2024-05-13

## 2024-05-21 ENCOUNTER — NON-APPOINTMENT (OUTPATIENT)
Age: 54
End: 2024-05-21

## 2025-03-13 ENCOUNTER — APPOINTMENT (OUTPATIENT)
Dept: ORTHOPEDIC SURGERY | Facility: CLINIC | Age: 55
End: 2025-03-13
Payer: COMMERCIAL

## 2025-03-13 DIAGNOSIS — S93.601A UNSPECIFIED SPRAIN OF RIGHT FOOT, INITIAL ENCOUNTER: ICD-10-CM

## 2025-03-13 DIAGNOSIS — S93.401A SPRAIN OF UNSPECIFIED LIGAMENT OF RIGHT ANKLE, INITIAL ENCOUNTER: ICD-10-CM

## 2025-03-13 DIAGNOSIS — S80.01XA CONTUSION OF RIGHT KNEE, INITIAL ENCOUNTER: ICD-10-CM

## 2025-03-13 DIAGNOSIS — C80.1 MALIGNANT (PRIMARY) NEOPLASM, UNSPECIFIED: ICD-10-CM

## 2025-03-13 PROCEDURE — 73562 X-RAY EXAM OF KNEE 3: CPT | Mod: RT

## 2025-03-13 PROCEDURE — 99203 OFFICE O/P NEW LOW 30 MIN: CPT

## 2025-03-13 PROCEDURE — 73610 X-RAY EXAM OF ANKLE: CPT | Mod: RT

## 2025-03-13 PROCEDURE — 73630 X-RAY EXAM OF FOOT: CPT | Mod: RT

## 2025-03-17 ENCOUNTER — APPOINTMENT (OUTPATIENT)
Dept: MRI IMAGING | Facility: CLINIC | Age: 55
End: 2025-03-17
Payer: COMMERCIAL

## 2025-03-17 PROCEDURE — 73721 MRI JNT OF LWR EXTRE W/O DYE: CPT | Mod: RT

## 2025-04-04 ENCOUNTER — APPOINTMENT (OUTPATIENT)
Facility: CLINIC | Age: 55
End: 2025-04-04